# Patient Record
Sex: MALE | Race: WHITE | NOT HISPANIC OR LATINO | Employment: FULL TIME | URBAN - METROPOLITAN AREA
[De-identification: names, ages, dates, MRNs, and addresses within clinical notes are randomized per-mention and may not be internally consistent; named-entity substitution may affect disease eponyms.]

---

## 2017-06-20 ENCOUNTER — OFFICE VISIT (OUTPATIENT)
Dept: FAMILY MEDICINE | Facility: CLINIC | Age: 37
End: 2017-06-20
Payer: COMMERCIAL

## 2017-06-20 VITALS
DIASTOLIC BLOOD PRESSURE: 80 MMHG | HEIGHT: 74 IN | WEIGHT: 201.8 LBS | TEMPERATURE: 97.4 F | SYSTOLIC BLOOD PRESSURE: 127 MMHG | BODY MASS INDEX: 25.9 KG/M2 | OXYGEN SATURATION: 97 % | HEART RATE: 93 BPM

## 2017-06-20 DIAGNOSIS — F10.21 HISTORY OF ALCOHOLISM (H): ICD-10-CM

## 2017-06-20 DIAGNOSIS — G47.419 PRIMARY NARCOLEPSY WITHOUT CATAPLEXY: ICD-10-CM

## 2017-06-20 DIAGNOSIS — F17.210 SMOKES CIGARETTES: ICD-10-CM

## 2017-06-20 DIAGNOSIS — B00.1 RECURRENT COLD SORES: Primary | ICD-10-CM

## 2017-06-20 PROCEDURE — 99214 OFFICE O/P EST MOD 30 MIN: CPT | Performed by: FAMILY MEDICINE

## 2017-06-20 RX ORDER — VALACYCLOVIR HYDROCHLORIDE 1 G/1
2000 TABLET, FILM COATED ORAL 2 TIMES DAILY
Qty: 30 TABLET | Refills: 4 | Status: SHIPPED | OUTPATIENT
Start: 2017-06-20 | End: 2018-07-17

## 2017-06-20 NOTE — MR AVS SNAPSHOT
"              After Visit Summary   6/20/2017    Roddy Petit    MRN: [id number]           Patient Information     Date Of Birth          1980        Visit Information        Provider Department      6/20/2017 5:00 PM Martina Gillis MD [facility]        Today's Diagnoses     Recurrent cold sores    -  1    Smokes cigarettes        History of alcoholism (H)        Primary narcolepsy without cataplexy          Care Instructions      (B00.1) Recurrent cold sores  (primary encounter diagnosis)  Plan: valACYclovir (VALTREX) 1000 mg tablet 2 tabs twice daily for a single day  additional tabs provided to be used as needed  For same single day course            (F17.210) Smokes cigarettes  Comment: smokes 2 cigars a day- was chewing tobacco  encouarged smoke cessation- return office visit  to discuss medication options chantix vx zyban  Plan: as above  encouraged to cut back on the amount of cigar          Follow-ups after your visit        Who to contact     If you have questions or need follow up information about today's clinic visit or your schedule please contact [facility] directly at [phone].  Normal or non-critical lab and imaging results will be communicated to you by aXess americahart, letter or phone within 4 business days after the clinic has received the results. If you do not hear from us within 7 days, please contact the clinic through GiftLaunchert or phone. If you have a critical or abnormal lab result, we will notify you by phone as soon as possible.  Submit refill requests through Stroz Friedberg or call your pharmacy and they will forward the refill request to us. Please allow 3 business days for your refill to be completed.          Additional Information About Your Visit        MyChart Information     Stroz Friedberg lets you send messages to your doctor, view your test results, renew your prescriptions, schedule appointments and more. To sign up, go to [email or web address] . Click on \"Log " "in\" on the left side of the screen, which will take you to the Welcome page. Then click on \"Sign up Now\" on the right side of the page.     You will be asked to enter the access code listed below, as well as some personal information. Please follow the directions to create your username and password.     Your access code is: [email or web address]  Expires: 2017  5:39 PM     Your access code will  in 90 days. If you need help or a new code, please call your [facility] or [phone].        Care EveryWhere ID     This is your Care EveryWhere ID. This could be used by other organizations to access your [place] medical records  [id number]        Your Vitals Were     Pulse Temperature Height Pulse Oximetry BMI (Body Mass Index)       93 97.4  F (36.3  C) (Oral) 6' 1.5\" (1.867 m) 97% 26.26 kg/m2        Blood Pressure from Last 3 Encounters:   17 127/80   16 126/76   09/10/13 120/80    Weight from Last 3 Encounters:   17 201 lb 12.8 oz (91.5 kg)   16 202 lb 11.2 oz (91.9 kg)   09/10/13 207 lb (93.9 kg)              Today, you had the following     No orders found for display         Today's Medication Changes          These changes are accurate as of: 17  5:39 PM.  If you have any questions, ask your nurse or doctor.               Start taking these medicines.        Dose/Directions    valACYclovir 1000 mg tablet   Commonly known as:  VALTREX   Used for:  Recurrent cold sores   Started by:  Martina Gillis MD        Dose:  2000 mg   Take 2 tablets (2,000 mg) by mouth 2 times daily for 1 day   Quantity:  30 tablet   Refills:  4            Where to get your medicines      Some of these will need a paper prescription and others can be bought over the counter.  Ask your nurse if you have questions.     Bring a paper prescription for each of these medications     valACYclovir 1000 mg tablet                Primary Care Provider Office Phone # Fax #    Martina Gillis MD " [phone]       [facility] [address] [address]        Thank you!     Thank you for choosing [facility]  for your care. Our goal is always to provide you with excellent care. Hearing back from our patients is one way we can continue to improve our services. Please take a few minutes to complete the written survey that you may receive in the mail after your visit with us. Thank you!             Your Updated Medication List - Protect others around you: Learn how to safely use, store and throw away your medicines at www.disposemymeds.org.          This list is accurate as of: 6/20/17  5:39 PM.  Always use your most recent med list.                   Brand Name Dispense Instructions for use    ADDERALL 15 MG per tablet   Generic drug:  amphetamine-dextroamphetamine     60 tablet    Take 1 tablet (15 mg) by mouth 3 times daily       valACYclovir 1000 mg tablet    VALTREX    30 tablet    Take 2 tablets (2,000 mg) by mouth 2 times daily for 1 day

## 2017-06-20 NOTE — NURSING NOTE
"Chief Complaint   Patient presents with     Derm Problem     /80  Pulse 93  Temp 97.4  F (36.3  C) (Oral)  Ht 6' 1.5\" (1.867 m)  Wt 201 lb 12.8 oz (91.5 kg)  SpO2 97%  BMI 26.26 kg/m2 Estimated body mass index is 26.26 kg/(m^2) as calculated from the following:    Height as of this encounter: 6' 1.5\" (1.867 m).    Weight as of this encounter: 201 lb 12.8 oz (91.5 kg).  BP completed using cuff size: regular       Health Maintenance due pending provider review:  NONE    n/a      Calvin De Anda CMA  "

## 2017-06-20 NOTE — PROGRESS NOTES
"  SUBJECTIVE:                                                    Roddy Petit is a 37 year old male who presents to clinic today for the following health issues:      Cold sore       Duration: 3-4 weeks     Description  Location: lip  Itching: no, tingles off/on    Intensity:  moderate    Accompanying signs and symptoms: None    History (similar episodes/previous evaluation): yes 2 one in 2 months     Precipitating or alleviating factors:  New exposures:  None  Recent travel: no      Therapies tried and outcome: Patriciaeva   Helps but not going away. Sofie - helped some        PROBLEMS TO ADD ON...    Problem list and histories reviewed & adjusted, as indicated.  Additional history: as documented    Labs reviewed in EPIC    Reviewed and updated as needed this visit by clinical staff  Tobacco  Allergies  Meds  Med Hx  Surg Hx  Fam Hx  Soc Hx      Reviewed and updated as needed this visit by Provider         ROS:  Constitutional, HEENT, cardiovascular, pulmonary, gi and gu systems are negative, except as otherwise noted.    OBJECTIVE:                                                    /80  Pulse 93  Temp 97.4  F (36.3  C) (Oral)  Ht 6' 1.5\" (1.867 m)  Wt 201 lb 12.8 oz (91.5 kg)  SpO2 97%  BMI 26.26 kg/m2  Body mass index is 26.26 kg/(m^2).  GENERAL: healthy, alert and no distress  NECK: no adenopathy, no asymmetry, masses, or scars and thyroid normal to palpation  RESP: lungs clear to auscultation - no rales, rhonchi or wheezes  CV: regular rate and rhythm, normal S1 S2, no S3 or S4, no murmur, click or rub, no peripheral edema and peripheral pulses strong  MS: no gross musculoskeletal defects noted, no edema    Diagnostic Test Results:     ASSESSMENT/PLAN:                                                      (B00.1) Recurrent cold sores  (primary encounter diagnosis)  Plan: valACYclovir (VALTREX) 1000 mg tablet 2 tabs twice daily for a single day  additional tabs provided to be used as needed  For " same single day course            (F17.210) Smokes cigarettes  Comment: smokes 2 cigars a day- was chewing tobacco  encouarged smoke cessation- ROV to discuss medication options chantix vx zyban  Plan: as above    (F10.21) History of alcoholism (H)  Comment: Since 2009- sobriety  Plan: commendable and encouarging    (G47.419) Primary narcolepsy without cataplexy  Comment: stable on adderall under care of    Plan: no changed    Potential medication side effects were discussed with the patient; let me know if any occur.      Potential medication side effects were discussed with the patient; let me know if any occur.  The patient indicates understanding of these issues and agrees with the plan.            Martina Gillis MD  [facility]

## 2017-06-20 NOTE — PATIENT INSTRUCTIONS
(B00.1) Recurrent cold sores  (primary encounter diagnosis)  Plan: valACYclovir (VALTREX) 1000 mg tablet 2 tabs twice daily for a single day  additional tabs provided to be used as needed  For same single day course            (F17.210) Smokes cigarettes  Comment: smokes 2 cigars a day- was chewing tobacco  encouarged smoke cessation- return office visit  to discuss medication options chantix vx zyban  Plan: as above  encouraged to cut back on the amount of cigar

## 2017-12-19 ENCOUNTER — OFFICE VISIT (OUTPATIENT)
Dept: FAMILY MEDICINE | Facility: CLINIC | Age: 37
End: 2017-12-19
Payer: COMMERCIAL

## 2017-12-19 VITALS
OXYGEN SATURATION: 100 % | HEIGHT: 74 IN | WEIGHT: 199.2 LBS | SYSTOLIC BLOOD PRESSURE: 119 MMHG | DIASTOLIC BLOOD PRESSURE: 74 MMHG | BODY MASS INDEX: 25.57 KG/M2 | HEART RATE: 89 BPM | TEMPERATURE: 97.4 F

## 2017-12-19 DIAGNOSIS — M25.561 ACUTE PAIN OF RIGHT KNEE: Primary | ICD-10-CM

## 2017-12-19 DIAGNOSIS — D22.9 ATYPICAL MOLE: ICD-10-CM

## 2017-12-19 DIAGNOSIS — M21.41 FLAT FEET, BILATERAL: ICD-10-CM

## 2017-12-19 DIAGNOSIS — M21.42 FLAT FEET, BILATERAL: ICD-10-CM

## 2017-12-19 PROCEDURE — 99214 OFFICE O/P EST MOD 30 MIN: CPT | Performed by: FAMILY MEDICINE

## 2017-12-19 NOTE — PATIENT INSTRUCTIONS
Right anterior knee pain  symptomatic treatment- with ice or warm packs if in pain. Ibuprofen  600 mg with joy up to three times daily as needed - always with meals    Knee brace over the counter      Start PHYSICAL THERAPY if pain persist  [facility]Y [place] [facility], [phone]       Flat feet  Orthotics    Right toe- Mole/ atypica; derm referral

## 2017-12-19 NOTE — NURSING NOTE
"Chief Complaint   Patient presents with     Musculoskeletal Problem       Initial /74  Pulse 89  Temp 97.4  F (36.3  C) (Oral)  Ht 6' 1.5\" (1.867 m)  Wt 199 lb 3.2 oz (90.4 kg)  SpO2 100%  BMI 25.92 kg/m2 Estimated body mass index is 25.92 kg/(m^2) as calculated from the following:    Height as of this encounter: 6' 1.5\" (1.867 m).    Weight as of this encounter: 199 lb 3.2 oz (90.4 kg).  Medication Reconciliation: complete      Health Maintenance that is potentially due pending provider review:  NONE    n/a    NINA Newman  "

## 2017-12-19 NOTE — PROGRESS NOTES
"  SUBJECTIVE:   Roddy Petit is a 37 year old male who presents to clinic today for the following health issues:    He reports increase excercise /running on treadmill  Noted pain on the anterior of the knee and inside of right lower tigh, felt it was associated with the knee looking read and felt warm- pain and redness improved on stopping excercise     No acute swelling, or involvement of any further joints    Musculoskeletal problem/pain      Duration: x1 week    Description  Location: RT knee     Intensity:  Mild to moderate     Accompanying signs and symptoms: tingling and warmth    History  Previous similar problem: no   Previous evaluation:  none    Precipitating or alleviating factors:  Trauma or overuse: no   Aggravating factors include: exercise    Therapies tried and outcome: ice, support wrap, advil, hot bath - helps with symptoms         PROBLEMS TO ADD ON...    Problem list and histories reviewed & adjusted, as indicated.  Additional history: as documented    Labs reviewed in EPIC    Reviewed and updated as needed this visit by clinical staff     Reviewed and updated as needed this visit by Provider         ROS:  Constitutional, HEENT, cardiovascular, pulmonary, gi and gu systems are negative, except as otherwise noted.      OBJECTIVE:   /74  Pulse 89  Temp 97.4  F (36.3  C) (Oral)  Ht 6' 1.5\" (1.867 m)  Wt 199 lb 3.2 oz (90.4 kg)  SpO2 100%  BMI 25.92 kg/m2  Body mass index is 25.92 kg/(m^2).  GENERAL: healthy, alert and no distress  Knee exam: no swelling/effusion.  Good ROM. No joint line tenderness.   Flat feet.  Right big toe has dark mole about 2mms, with brown irregular halo around it- he reports he does not recall recent, remote direct trauma  NECK: no adenopathy, no asymmetry, masses, or scars and thyroid normal to palpation  RESP: lungs clear to auscultation - no rales, rhonchi or wheezes  CV: regular rate and rhythm  ASSESSMENT/PLAN:     Right anterior knee pain- tendonitis. "   Plan: advised relative rest.  symptomatic treatment- with ice or warm packs if in pain. Ibuprofen  600 mg with meals  up to three times daily as needed   Knee brace over the counter  Start PHYSICAL THERAPY if pain persist      Flat feet  Possibly adding to pain in the knee  Advised Orthotics    Right toe- Mole/ atypica; derm referral           Martina Gillis MD  [facility]

## 2017-12-19 NOTE — MR AVS SNAPSHOT
After Visit Summary   12/19/2017    Roddy Petit    MRN: [id number]           Patient Information     Date Of Birth          1980        Visit Information        Provider Department      12/19/2017 3:30 PM Martina Gillis MD [facility]        Today's Diagnoses     Acute pain of right knee    -  1    Flat feet, bilateral        Atypical mole          Care Instructions    Right anterior knee pain  symptomatic treatment- with ice or warm packs if in pain. Ibuprofen  600 mg with joy up to three times daily as needed - always with meals    Knee brace over the counter      Start PHYSICAL THERAPY if pain persist  PHYSICAL THERAPY [place] [facility], [phone]       Flat feet  Orthotics    Right toe- Mole/ atypica; derm referral           Follow-ups after your visit        Additional Services     DERMATOLOGY REFERRAL       Your provider has referred you to: [facility]: Dermatology Clinic [facility] [phone]   [email or web address]  [facility]: [facility]at[facility] [phone]   [email or web address]  [facility]: [place] [facility] [phone]  [email or web address]    Please be aware that coverage of these services is subject to the terms and limitations of your health insurance plan.  Call member services at your health plan with any benefit or coverage questions.      Please bring the following with you to your appointment:    (1) Any X-Rays, CTs or MRIs which have been performed.  Contact the facility where they were done to arrange for  prior to your scheduled appointment.    (2) List of current medications  (3) This referral request   (4) Any documents/labs given to you for this referral            [facility] PT, HAND, AND CHIROPRACTIC REFERRAL       **This order will print in the [facility] Scheduling Office**    Physical Therapy, Hand Therapy and Chiropractic Care are available through:    *[place] [facility]  *[facility]  *[facility]    Call one number to schedule at any of the above locations: [phone].    Your provider has referred you to: Physical Therapy at [facility] or [facility]    Indication/Reason for Referral: anterior knee pain, mild tendonistis, plays volley ball as well and flat feet  Onset of Illness: weeks   Therapy Orders: Evaluate and Treat  Special Programs: None  Special Request: None    Umair Block      Additional Comments for the Therapist or Chiropractor:     Please be aware that coverage of these services is subject to the terms and limitations of your health insurance plan.  Call member services at your health plan with any benefit or coverage questions.      Please bring the following to your appointment:    *Your personal calendar for scheduling future appointments  *Comfortable clothing                  Who to contact     If you have questions or need follow up information about today's clinic visit or your schedule please contact [facility] directly at [phone].  Normal or non-critical lab and imaging results will be communicated to you by MyChart, letter or phone within 4 business days after the clinic has received the results. If you do not hear from us within 7 days, please contact the clinic through Paymohart or phone. If you have a critical or abnormal lab result, we will notify you by phone as soon as possible.  Submit refill requests through Responsa or call your pharmacy and they will forward the refill request to us. Please allow 3 business days for your refill to be completed.          Additional Information About Your Visit        Paymohart Information     Responsa lets you send messages to your doctor, view your test results, renew your prescriptions, schedule appointments and more. To sign up, go to [email or web address] . Click on \"Log in\" on the left side of the screen, which will take you to the Welcome page. Then " "click on \"Sign up Now\" on the right side of the page.     You will be asked to enter the access code listed below, as well as some personal information. Please follow the directions to create your username and password.     Your access code is: [email or web address]  Expires: 3/19/2018  4:02 PM     Your access code will  in 90 days. If you need help or a new code, please call your [facility]] or [phone].        Care EveryWhere ID     This is your Care EveryWhere ID. This could be used by other organizations to access your [place] medical records  [id number]        Your Vitals Were     Pulse Temperature Height Pulse Oximetry BMI (Body Mass Index)       89 97.4  F (36.3  C) (Oral) 6' 1.5\" (1.867 m) 100% 25.92 kg/m2        Blood Pressure from Last 3 Encounters:   17 119/74   17 127/80   16 126/76    Weight from Last 3 Encounters:   17 199 lb 3.2 oz (90.4 kg)   17 201 lb 12.8 oz (91.5 kg)   16 202 lb 11.2 oz (91.9 kg)              We Performed the Following     DERMATOLOGY REFERRAL     [facility] PT, HAND, AND CHIR[facility]RACTIC REFERRAL          Today's Medication Changes          These changes are accurate as of: 17  4:02 PM.  If you have any questions, ask your nurse or doctor.               Start taking these medicines.        Dose/Directions    order for DME   Used for:  Flat feet, bilateral   Started by:  Martina Gillis MD        Equipment being ordered: orthotics   Quantity:  2 Units   Refills:  1            Where to get your medicines      Some of these will need a paper prescription and others can be bought over the counter.  Ask your nurse if you have questions.     Bring a paper prescription for each of these medications     order for DME                Primary Care Provider Office Phone # Fax #    Martina Gillis [phone] [address] [facility] [address]        Equal Access to Services     RENEE BOTELLO AH: Percy jordan " Nini, venusda lumikeadaha, qajoshuata kaethan sommers, palmer ibarra tessademetrio weberzack noel. So [facility] [phone].    ATENCIÓN: Si wendy jiménez, tiene a aceves disposición servicios gratuitos de asistencia lingüística. Kyle al [phone].    We comply with applicable federal civil rights laws and [place] laws. We do not discriminate on the basis of race, color, national origin, age, disability, sex, sexual orientation, or gender identity.            Thank you!     Thank you for choosing [facility]  for your care. Our goal is always to provide you with excellent care. Hearing back from our patients is one way we can continue to improve our services. Please take a few minutes to complete the written survey that you may receive in the mail after your visit with us. Thank you!             Your Updated Medication List - Protect others around you: Learn how to safely use, store and throw away your medicines at www.disposemymeds.org.          This list is accurate as of: 12/19/17  4:02 PM.  Always use your most recent med list.                   Brand Name Dispense Instructions for use Diagnosis    ADDERALL 15 MG per tablet   Generic drug:  amphetamine-dextroamphetamine     60 tablet    Take 1 tablet (15 mg) by mouth 3 times daily        order for DME     2 Units    Equipment being ordered: orthotics    Flat feet, bilateral       valACYclovir 1000 mg tablet    VALTREX    30 tablet    Take 2 tablets (2,000 mg) by mouth 2 times daily for 1 day    Recurrent cold sores

## 2018-02-20 ENCOUNTER — OFFICE VISIT (OUTPATIENT)
Dept: FAMILY MEDICINE | Facility: CLINIC | Age: 38
End: 2018-02-20
Payer: COMMERCIAL

## 2018-02-20 VITALS
SYSTOLIC BLOOD PRESSURE: 122 MMHG | HEIGHT: 74 IN | DIASTOLIC BLOOD PRESSURE: 78 MMHG | WEIGHT: 206.5 LBS | TEMPERATURE: 98.5 F | OXYGEN SATURATION: 100 % | HEART RATE: 88 BPM | BODY MASS INDEX: 26.5 KG/M2 | RESPIRATION RATE: 15 BRPM

## 2018-02-20 DIAGNOSIS — B30.9 ACUTE VIRAL CONJUNCTIVITIS OF LEFT EYE: Primary | ICD-10-CM

## 2018-02-20 PROCEDURE — 99213 OFFICE O/P EST LOW 20 MIN: CPT | Performed by: FAMILY MEDICINE

## 2018-02-20 RX ORDER — CIPROFLOXACIN HYDROCHLORIDE 3.5 MG/ML
SOLUTION/ DROPS TOPICAL
Qty: 1 BOTTLE | Refills: 0 | Status: SHIPPED | OUTPATIENT
Start: 2018-02-20 | End: 2018-02-24

## 2018-02-20 NOTE — MR AVS SNAPSHOT
After Visit Summary   2/20/2018    Roddy Petit    MRN: [id number]           Patient Information     Date Of Birth          1980        Visit Information        Provider Department      2/20/2018 4:45 PM Dewayne Sheppard MD [facility]        Today's Diagnoses     Acute bacterial conjunctivitis of left eye    -  1      Care Instructions      What Is Conjunctivitis?    Conjunctivitis is an irritation or infection. It affects the membrane that covers the white of your eye and the inside of your eyelid (conjunctiva). It can happen to one or both eyes. The membrane swells and the blood vessels enlarge (dilate). This makes your eye red. That's why conjunctivitis is sometimes called red eye or pink eye.  What are the symptoms?  If you have one or more of these symptoms, see an eye doctor:    Redness in and around your eye    Eyes that are puffy and sore    Itching, burning, or stinging eyes    Watery eyes or discharge from your eye    Eyelids that are crusty or stuck together when you wake up in the morning    Pink color in the whites of one or both eyes  Getting treatment quickly can help prevent damage to your eyes.  How is it diagnosed?  Conjunctivitis is usually a minor eye infection. But it can sometimes become a more serious problem. Some more serious eye diseases have symptoms that look like conjunctivitis. So it's important for an eye doctor to diagnose you. Your eye doctor will ask about your symptoms and any medicines you take. He or she will ask about any illnesses or medical conditions you may have. The doctor will also check your eyes with a hand-held light and a special microscope called a slit lamp.  Date Last Reviewed: 6/11/2015 2000-2017 [employer]. [address] [place] [address]. All rights reserved. This information is not intended as a substitute for professional medical care. Always follow your healthcare professional's  "instructions.          Follow-ups after your visit        Follow-up notes from your care team     Return if symptoms worsen or fail to improve.      Who to contact     If you have questions or need follow up information about today's clinic visit or your schedule please contact [facility] directly at [phone].  Normal or non-critical lab and imaging results will be communicated to you by MyChart, letter or phone within 4 business days after the clinic has received the results. If you do not hear from us within 7 days, please contact the clinic through MyChart or phone. If you have a critical or abnormal lab result, we will notify you by phone as soon as possible.  Submit refill requests through [employer] or call your pharmacy and they will forward the refill request to us. Please allow 3 business days for your refill to be completed.          Additional Information About Your Visit        MyChart Information     [employer] lets you send messages to your doctor, view your test results, renew your prescriptions, schedule appointments and more. To sign up, go to [email or web address] . Click on \"Log in\" on the left side of the screen, which will take you to the Welcome page. Then click on \"Sign up Now\" on the right side of the page.     You will be asked to enter the access code listed below, as well as some personal information. Please follow the directions to create your username and password.     Your access code is: [email or web address]  Expires: 3/19/2018  4:02 PM     Your access code will  in 90 days. If you need help or a new code, please call your [place] clinic or [phone].        Care EveryWhere ID     This is your Care EveryWhere ID. This could be used by other organizations to access your [place] medical records  [id number]        Your Vitals Were     Pulse Temperature Respirations Height Pulse Oximetry BMI (Body Mass Index)    88 98.5  F (36.9  C) (Tympanic) 15 6' 1.5\" (1.867 m) 100% " 26.87 kg/m2       Blood Pressure from Last 3 Encounters:   02/20/18 122/78   12/19/17 119/74   06/20/17 127/80    Weight from Last 3 Encounters:   02/20/18 206 lb 8 oz (93.7 kg)   12/19/17 199 lb 3.2 oz (90.4 kg)   06/20/17 201 lb 12.8 oz (91.5 kg)              Today, you had the following     No orders found for display         Today's Medication Changes          These changes are accurate as of 2/20/18  4:55 PM.  If you have any questions, ask your nurse or doctor.               Start taking these medicines.        Dose/Directions    ciprofloxacin 0.3 % ophthalmic solution   Commonly known as:  CILOXAN   Used for:  Acute bacterial conjunctivitis of left eye   Started by:  Dewayne Sheppard MD        Instill 1-2 drops in the affected eye(s) every 2 hours while awake for 2 days then 1-2 drops every 4 hours while awake for the next 5 days.   Quantity:  1 Bottle   Refills:  0            Where to get your medicines      These medications were sent to [facility] [address] [place] [address] [address] [facility] [address] [facility] [address]     Phone:  [phone]     ciprofloxacin 0.3 % ophthalmic solution                Primary Care Provider Office Phone # Fax #    Martina Elana Gillis [phone] [address] [facility] [address]        Equal Access to Services     RENEE BOTELLO AH: Hadii tacos camposo Solaura, waaxda luqadaha, qaybta kaalmafrancisco velezay alisha cohen. So [facility] [phone].    ATENCIÓN: Si habla español, tiene a aceves disposición servicios gratuitos de asistencia lingüística. Llame al [phone].    We comply with applicable federal civil rights laws and [place] laws. We do not discriminate on the basis of race, color, national origin, age, disability, sex, sexual orientation, or gender identity.            Thank you!     Thank you for choosing [facility]  for your care. Our goal  is always to provide you with excellent care. Hearing back from our patients is one way we can continue to improve our services. Please take a few minutes to complete the written survey that you may receive in the mail after your visit with us. Thank you!             Your Updated Medication List - Protect others around you: Learn how to safely use, store and throw away your medicines at www.disposemymeds.org.          This list is accurate as of 2/20/18  4:55 PM.  Always use your most recent med list.                   Brand Name Dispense Instructions for use Diagnosis    ADDERALL 15 MG per tablet   Generic drug:  amphetamine-dextroamphetamine     60 tablet    Take 1 tablet (15 mg) by mouth 3 times daily        ciprofloxacin 0.3 % ophthalmic solution    CILOXAN    1 Bottle    Instill 1-2 drops in the affected eye(s) every 2 hours while awake for 2 days then 1-2 drops every 4 hours while awake for the next 5 days.    Acute bacterial conjunctivitis of left eye       order for DME     2 Units    Equipment being ordered: orthotics    Flat feet, bilateral       valACYclovir 1000 mg tablet    VALTREX    30 tablet    Take 2 tablets (2,000 mg) by mouth 2 times daily for 1 day    Recurrent cold sores

## 2018-02-20 NOTE — PROGRESS NOTES
"  SUBJECTIVE:   Roddy Petit is a 38 year old male who presents to clinic today for the following health issues:      Chief Complaint   Patient presents with     Eye Problem     Left eye is extremely red, irritated and has lots of drainage since yesterday.  Woke up this morning and his eye was matted closed.     Left eye redness x 1 day. Denies any vision changes. Denies any headaches. Denies any fevers or chills.     Problem list and histories reviewed & adjusted, as indicated.  Additional history: as documented    Patient Active Problem List   Diagnosis     Narcolepsy     CARDIOVASCULAR SCREENING; LDL GOAL LESS THAN 160     History of alcoholism (H)     Primary narcolepsy without cataplexy     History reviewed. No pertinent surgical history.    Social History   Substance Use Topics     Smoking status: Current Some Day Smoker     Types: Cigars     Smokeless tobacco: Never Used     Alcohol use No     Family History   Problem Relation Age of Onset     Family History Negative Father      Family History Negative Mother            Reviewed and updated as needed this visit by clinical staff  Allergies  Meds       Reviewed and updated as needed this visit by Provider         ROS:  Constitutional, HEENT, cardiovascular, pulmonary, gi and gu systems are negative, except as otherwise noted.    OBJECTIVE:     /78  Pulse 88  Temp 98.5  F (36.9  C) (Tympanic)  Resp 15  Ht 6' 1.5\" (1.867 m)  Wt 206 lb 8 oz (93.7 kg)  SpO2 100%  BMI 26.87 kg/m2  Body mass index is 26.87 kg/(m^2).  GENERAL: healthy, alert and no distress  EYES: EOMI. left conjunctival erythema with crusting on the eyelashes.  PERRL  PSYCH: mentation appears normal, affect normal/bright    Diagnostic Test Results:  none     ASSESSMENT/PLAN:   1. Acute viral conjunctivitis of left eye  Assessment: Exam is consistent with viral conjunctivitis.  Patient was given a prescription of antibiotics to prevent secondary bacterial infection.  Plan:  - " ciprofloxacin (CILOXAN) 0.3 % ophthalmic solution; Instill 1-2 drops in the affected eye(s) every 2 hours while awake for 2 days then 1-2 drops every 4 hours while awake for the next 5 days.  Dispense: 1 Bottle; Refill: 0      Dewayne Sheppard MD  [facility] [place]  PAGER: [phone] or (W): [phone]

## 2018-02-20 NOTE — PATIENT INSTRUCTIONS
What Is Conjunctivitis?    Conjunctivitis is an irritation or infection. It affects the membrane that covers the white of your eye and the inside of your eyelid (conjunctiva). It can happen to one or both eyes. The membrane swells and the blood vessels enlarge (dilate). This makes your eye red. That's why conjunctivitis is sometimes called red eye or pink eye.  What are the symptoms?  If you have one or more of these symptoms, see an eye doctor:    Redness in and around your eye    Eyes that are puffy and sore    Itching, burning, or stinging eyes    Watery eyes or discharge from your eye    Eyelids that are crusty or stuck together when you wake up in the morning    Pink color in the whites of one or both eyes  Getting treatment quickly can help prevent damage to your eyes.  How is it diagnosed?  Conjunctivitis is usually a minor eye infection. But it can sometimes become a more serious problem. Some more serious eye diseases have symptoms that look like conjunctivitis. So it's important for an eye doctor to diagnose you. Your eye doctor will ask about your symptoms and any medicines you take. He or she will ask about any illnesses or medical conditions you may have. The doctor will also check your eyes with a hand-held light and a special microscope called a slit lamp.  Date Last Reviewed: 6/11/2015 2000-2017 [employer]. [address] [place] [address], [place] [address]. All rights reserved. This information is not intended as a substitute for professional medical care. Always follow your healthcare professional's instructions.

## 2018-02-24 ENCOUNTER — NURSE TRIAGE (OUTPATIENT)
Dept: NURSING | Facility: CLINIC | Age: 38
End: 2018-02-24

## 2018-02-24 DIAGNOSIS — B30.9 ACUTE VIRAL CONJUNCTIVITIS OF LEFT EYE: ICD-10-CM

## 2018-02-24 RX ORDER — CIPROFLOXACIN HYDROCHLORIDE 3.5 MG/ML
SOLUTION/ DROPS TOPICAL
Qty: 1 BOTTLE | Refills: 0 | Status: SHIPPED | OUTPATIENT
Start: 2018-02-24 | End: 2019-10-24

## 2018-02-24 NOTE — TELEPHONE ENCOUNTER
----- Message from Krishan Escudero sent at 2/24/2018 12:09 PM CST -----  Reason for Call:  Medication or medication refill:    Do you use a [facility]?  Name of the pharmacy and phone number for the current request: [address] [place], [place]    Name of the medication requested: ciprofloxacin (CILOXAN) 0.3 % ophthalmic solution    Other request: Patient is out of this medication and states he needs to take it until Tuesday. Wondering if you can call in a renewal for him. Please advise.    Can we leave a detailed message on this number? YES    Phone number patient can be reached at: Home number on file [phone] (home)    Best Time: Anytime    Call taken on 2/24/2018 at 12:08 PM by Krishan Escudeor

## 2018-02-24 NOTE — TELEPHONE ENCOUNTER
"\"I was given eye drops for my left eye on 2/20/18 but then woke up the next day with my right eye affected.  I have been using the drops in both eyes so I ran out.\"      Smart Web used to page Dr. ALVARADO at 12:24pm to [phone].  She returned the call shortly after and gave the approval to send the refill.      Palmira Evans RN/FNA    "

## 2018-07-17 ENCOUNTER — OFFICE VISIT (OUTPATIENT)
Dept: FAMILY MEDICINE | Facility: CLINIC | Age: 38
End: 2018-07-17
Payer: COMMERCIAL

## 2018-07-17 VITALS
HEART RATE: 73 BPM | DIASTOLIC BLOOD PRESSURE: 82 MMHG | HEIGHT: 74 IN | WEIGHT: 201.9 LBS | TEMPERATURE: 97.9 F | BODY MASS INDEX: 25.91 KG/M2 | OXYGEN SATURATION: 99 % | SYSTOLIC BLOOD PRESSURE: 126 MMHG

## 2018-07-17 DIAGNOSIS — G47.419 PRIMARY NARCOLEPSY WITHOUT CATAPLEXY: ICD-10-CM

## 2018-07-17 DIAGNOSIS — L73.9 FOLLICULITIS: Primary | ICD-10-CM

## 2018-07-17 DIAGNOSIS — B00.1 RECURRENT COLD SORES: ICD-10-CM

## 2018-07-17 PROCEDURE — 36415 COLL VENOUS BLD VENIPUNCTURE: CPT | Performed by: FAMILY MEDICINE

## 2018-07-17 PROCEDURE — 99214 OFFICE O/P EST MOD 30 MIN: CPT | Performed by: FAMILY MEDICINE

## 2018-07-17 PROCEDURE — 80053 COMPREHEN METABOLIC PANEL: CPT | Performed by: FAMILY MEDICINE

## 2018-07-17 RX ORDER — CEPHALEXIN 500 MG/1
500 CAPSULE ORAL 2 TIMES DAILY
Qty: 10 CAPSULE | Refills: 0 | Status: SHIPPED | OUTPATIENT
Start: 2018-07-17 | End: 2018-07-31

## 2018-07-17 RX ORDER — VALACYCLOVIR HYDROCHLORIDE 1 G/1
2000 TABLET, FILM COATED ORAL 2 TIMES DAILY
Qty: 30 TABLET | Refills: 4 | Status: SHIPPED | OUTPATIENT
Start: 2018-07-17 | End: 2019-02-13

## 2018-07-17 NOTE — PROGRESS NOTES
"  SUBJECTIVE:   Roddy Petit is a 38 year old male who presents to clinic today for the following health issues:      Mass/Lump      Duration: noticed pea size nontender hard nodule, below the scrotum about a week ago     Description (location/character/radiation): below the scrotum    Intensity:  Firm to touch     Accompanying signs and symptoms: small hard lump about the size of a pea below testicle     History (similar episodes/previous evaluation): None    Precipitating or alleviating factors: None    Therapies tried and outcome: None       PROBLEMS TO ADD ON...recurrent cold sores- more so lately- as been under considerable stress at work - would like refill    History of narcolepsy- stable on adderall under care of        Problem list and histories reviewed & adjusted, as indicated.  Additional history: as documented    Patient Active Problem List   Diagnosis     Narcolepsy     CARDIOVASCULAR SCREENING; LDL GOAL LESS THAN 160     History of alcoholism (H)     Primary narcolepsy without cataplexy     No past surgical history on file.    Social History   Substance Use Topics     Smoking status: Current Some Day Smoker     Types: Cigars     Smokeless tobacco: Never Used     Alcohol use No     Family History   Problem Relation Age of Onset     Family History Negative Father      Family History Negative Mother            Reviewed and updated as needed this visit by clinical staff  Tobacco       Reviewed and updated as needed this visit by Provider         ROS:  Constitutional, HEENT, cardiovascular, pulmonary, gi and gu systems are negative, except as otherwise noted.    OBJECTIVE:     /82  Pulse 73  Temp 97.9  F (36.6  C) (Oral)  Ht 6' 1.5\" (1.867 m)  Wt 201 lb 14.4 oz (91.6 kg)  SpO2 99%  BMI 26.28 kg/m2  Body mass index is 26.28 kg/(m^2).  GENERAL: healthy, alert and no distress  NECK: no adenopathy, no asymmetry, masses, or scars and thyroid normal to palpation  RESP: lungs clear to " auscultation - no rales, rhonchi or wheezes  CV: regular rate and rhythm,ABDOMEN: soft, nontender,   Genitals normal; both testes normal without tenderness, masses, hydroceles, varicoceles, erythema or swelling. Shaft normal, circumcised, meatus normal without discharge. No inguinal hernia noted. No inguinal lymphadenopathy.  SKIN: inflammed folliculitis over left anterior perineum in hair covered area. No vesicle, no ulcers    ASSESSMENT/PLAN:     1. Folliculitis  Ingrown hair- left perineum  Warm pack ok    - cephALEXin (KEFLEX) 500 MG capsule; Take 1 capsule (500 mg) by mouth 2 times daily for 5 days  Dispense: 10 capsule; Refill: 0    -follow up as needed    2. Recurrent cold sores    - valACYclovir (VALTREX) 1000 mg tablet; Take 2 tablets (2,000 mg) by mouth 2 times daily  Dispense: 30 tablet; Refill: 4  - Comprehensive metabolic panel (BMP + Alb, Alk Phos, ALT, AST, Total. Bili, TP)    3. Primary narcolepsy without cataplexy  stable on adderall under care of  Dr.Schweiter Martina Gillis MD  [facility]

## 2018-07-17 NOTE — LETTER
July 19, 2018      Roddy Petit  [address]   [place] [facility] [place] [address]        Dear ,    We are writing to inform you of your test results.    -Liver and gallbladder tests are normal. (ALT,AST, Alk phos, bilirubin), kidney function is normal (Cr, GFR), Sodium is normal, Potassium is normal, Calcium is normal, Glucose is high but fasting glucose reflects more accurately diabetes screening test     Resulted Orders   Comprehensive metabolic panel (BMP + Alb, Alk Phos, ALT, AST, Total. Bili, TP)   Result Value Ref Range    Sodium 139 133 - 144 mmol/L    Potassium 4.0 3.4 - 5.3 mmol/L    Chloride 105 94 - 109 mmol/L    Carbon Dioxide 26 20 - 32 mmol/L    Anion Gap 8 3 - 14 mmol/L    Glucose 90 70 - 99 mg/dL    Urea Nitrogen 19 7 - 30 mg/dL    Creatinine 0.91 0.66 - 1.25 mg/dL    GFR Estimate >90 >60 mL/min/1.7m2      Comment:      Non  GFR Calc    GFR Estimate If Black >90 >60 mL/min/1.7m2      Comment:       GFR Calc    Calcium 8.6 8.5 - 10.1 mg/dL    Bilirubin Total 0.3 0.2 - 1.3 mg/dL    Albumin 3.9 3.4 - 5.0 g/dL    Protein Total 7.1 6.8 - 8.8 g/dL    Alkaline Phosphatase 39 (L) 40 - 150 U/L    ALT 29 0 - 70 U/L    AST 18 0 - 45 U/L       If you have any questions or concerns, please call the clinic at the number listed above.       Sincerely,        Martina Gillis MD/ms

## 2018-07-17 NOTE — PATIENT INSTRUCTIONS
1. Folliculitis  Ingrown hair- left perineum  Warm pack ok  I  - cephALEXin (KEFLEX) 500 MG capsule; Take 1 capsule (500 mg) by mouth 2 times daily for 5 days  Dispense: 10 capsule; Refill: 0    2. Recurrent cold sores    - valACYclovir (VALTREX) 1000 mg tablet; Take 2 tablets (2,000 mg) by mouth 2 times daily  Dispense: 30 tablet; Refill: 4  - Comprehensive metabolic panel (BMP + Alb, Alk Phos, ALT, AST, Total. Bili, TP)

## 2018-07-17 NOTE — MR AVS SNAPSHOT
"              After Visit Summary   7/17/2018    Roddy Petit    MRN: [id number]           Patient Information     Date Of Birth          1980        Visit Information        Provider Department      7/17/2018 4:20 PM Martina Gillis MD [facility]        Today's Diagnoses     Primary narcolepsy without cataplexy    -  1    Folliculitis        Recurrent cold sores          Care Instructions    1. Folliculitis  Ingrown hair- left perineum  Warm pack ok  I  - cephALEXin (KEFLEX) 500 MG capsule; Take 1 capsule (500 mg) by mouth 2 times daily for 5 days  Dispense: 10 capsule; Refill: 0    2. Recurrent cold sores    - valACYclovir (VALTREX) 1000 mg tablet; Take 2 tablets (2,000 mg) by mouth 2 times daily  Dispense: 30 tablet; Refill: 4  - Comprehensive metabolic panel (BMP + Alb, Alk Phos, ALT, AST, Total. Bili, TP)          Follow-ups after your visit        Who to contact     If you have questions or need follow up information about today's clinic visit or your schedule please contact [facility] directly at [phone].  Normal or non-critical lab and imaging results will be communicated to you by MyChart, letter or phone within 4 business days after the clinic has received the results. If you do not hear from us within 7 days, please contact the clinic through MyChart or phone. If you have a critical or abnormal lab result, we will notify you by phone as soon as possible.  Submit refill requests through [employer] or call your pharmacy and they will forward the refill request to us. Please allow 3 business days for your refill to be completed.          Additional Information About Your Visit        Care EveryWhere ID     This is your Care EveryWhere ID. This could be used by other organizations to access your [place] medical records  [id number]        Your Vitals Were     Pulse Temperature Height Pulse Oximetry BMI (Body Mass Index)       73 97.9  F (36.6  C) (Oral) 6' 1.5\" (1.867 m) " 99% 26.28 kg/m2        Blood Pressure from Last 3 Encounters:   07/17/18 126/82   02/20/18 122/78   12/19/17 119/74    Weight from Last 3 Encounters:   07/17/18 201 lb 14.4 oz (91.6 kg)   02/20/18 206 lb 8 oz (93.7 kg)   12/19/17 199 lb 3.2 oz (90.4 kg)              We Performed the Following     Comprehensive metabolic panel (BMP + Alb, Alk Phos, ALT, AST, Total. Bili, TP)          Today's Medication Changes          These changes are accurate as of 7/17/18  4:37 PM.  If you have any questions, ask your nurse or doctor.               Start taking these medicines.        Dose/Directions    cephALEXin 500 MG capsule   Commonly known as:  KEFLEX   Used for:  Folliculitis   Started by:  Martina Gillis MD        Dose:  500 mg   Take 1 capsule (500 mg) by mouth 2 times daily for 5 days   Quantity:  10 capsule   Refills:  0            Where to get your medicines      [facility] [address] [place] [address] [employer] [address] [place] [address] [facility] [address]     Phone:  [phone]     cephALEXin 500 MG capsule    valACYclovir 1000 mg tablet                Primary Care Provider Office Phone # Fax #    Martina Gillis [phone] [address] [facility] [address]        Equal Access to Services     [facility] AH: Percy camposo Solaura, waaxda luqadaha, qaybta kaalmada tootie, palmer cohen. So [facility] [phone].    ATENCIÓN: Si habla tino, tiene a aceves disposición servicios gratuitos de asistencia lingüística. Llame al [phone].    We comply with applicable federal civil rights laws and [place] laws. We do not discriminate on the basis of race, color, national origin, age, disability, sex, sexual orientation, or gender identity.            Thank you!     Thank you for choosing [facility] [place] [facility]  for your care. Our goal is always to provide you with  excellent care. Hearing back from our patients is one way we can continue to improve our services. Please take a few minutes to complete the written survey that you may receive in the mail after your visit with us. Thank you!             Your Updated Medication List - Protect others around you: Learn how to safely use, store and throw away your medicines at www.disposemymeds.org.          This list is accurate as of 7/17/18  4:37 PM.  Always use your most recent med list.                   Brand Name Dispense Instructions for use Diagnosis    ADDERALL 15 MG per tablet   Generic drug:  amphetamine-dextroamphetamine     60 tablet    Take 1 tablet (15 mg) by mouth 3 times daily        cephALEXin 500 MG capsule    KEFLEX    10 capsule    Take 1 capsule (500 mg) by mouth 2 times daily for 5 days    Folliculitis       ciprofloxacin 0.3 % ophthalmic solution    CILOXAN    1 Bottle    Instill 1-2 drops in the affected eye(s) every 2 hours while awake for 2 days then 1-2 drops every 4 hours while awake for the next 5 days.    Acute viral conjunctivitis of left eye       order for DME     2 Units    Equipment being ordered: orthotics    Flat feet, bilateral       valACYclovir 1000 mg tablet    VALTREX    30 tablet    Take 2 tablets (2,000 mg) by mouth 2 times daily    Recurrent cold sores

## 2018-07-17 NOTE — NURSING NOTE
"Chief Complaint   Patient presents with     Mass     /82  Pulse 73  Temp 97.9  F (36.6  C) (Oral)  Ht 6' 1.5\" (1.867 m)  Wt 201 lb 14.4 oz (91.6 kg)  SpO2 99%  BMI 26.28 kg/m2 Estimated body mass index is 26.28 kg/(m^2) as calculated from the following:    Height as of this encounter: 6' 1.5\" (1.867 m).    Weight as of this encounter: 201 lb 14.4 oz (91.6 kg).  Medication Reconciliation: complete      Health Maintenance that is potentially due pending provider review:  NONE    n/a    NINA Newman  "

## 2018-07-18 LAB
ALBUMIN SERPL-MCNC: 3.9 G/DL (ref 3.4–5)
ALP SERPL-CCNC: 39 U/L (ref 40–150)
ALT SERPL W P-5'-P-CCNC: 29 U/L (ref 0–70)
ANION GAP SERPL CALCULATED.3IONS-SCNC: 8 MMOL/L (ref 3–14)
AST SERPL W P-5'-P-CCNC: 18 U/L (ref 0–45)
BILIRUB SERPL-MCNC: 0.3 MG/DL (ref 0.2–1.3)
BUN SERPL-MCNC: 19 MG/DL (ref 7–30)
CALCIUM SERPL-MCNC: 8.6 MG/DL (ref 8.5–10.1)
CHLORIDE SERPL-SCNC: 105 MMOL/L (ref 94–109)
CO2 SERPL-SCNC: 26 MMOL/L (ref 20–32)
CREAT SERPL-MCNC: 0.91 MG/DL (ref 0.66–1.25)
GFR SERPL CREATININE-BSD FRML MDRD: >90 ML/MIN/1.7M2
GLUCOSE SERPL-MCNC: 90 MG/DL (ref 70–99)
POTASSIUM SERPL-SCNC: 4 MMOL/L (ref 3.4–5.3)
PROT SERPL-MCNC: 7.1 G/DL (ref 6.8–8.8)
SODIUM SERPL-SCNC: 139 MMOL/L (ref 133–144)

## 2018-07-18 NOTE — PROGRESS NOTES
Send lab & letter      -Liver and gallbladder tests are normal. (ALT,AST, Alk phos, bilirubin), kidney function is normal (Cr, GFR), Sodium is normal, Potassium is normal, Calcium is normal, Glucose is high but fasting glucose reflects more accurately diabetes screening test    Please keep us posted with questions or concerns .      Best Regards,    Martina Gillis MD  [facility]  [phone]

## 2018-07-23 ENCOUNTER — TELEPHONE (OUTPATIENT)
Dept: FAMILY MEDICINE | Facility: CLINIC | Age: 38
End: 2018-07-23

## 2018-07-23 NOTE — TELEPHONE ENCOUNTER
VM let  Yes- glucose is normal- no concerns    If nodule not tender- no more antibiotic    If more concerns return office visit next week

## 2018-07-23 NOTE — TELEPHONE ENCOUNTER
Reason for Call:  Request for results:    Name of test or procedure: Glucose    Date of test of procedure: 7/17/18    Location of the test or procedure: [place]    OK to leave the result message on voice mail or with a family member? YES    Phone number Patient can be reached at:  Home number on file [phone] (home)    Additional comments: Pt would like to know if his glucose is high    Call taken on 7/23/2018 at 4:09 PM by Rajani Cohen

## 2018-07-23 NOTE — TELEPHONE ENCOUNTER
Dr Holcomb,  Patient called wanting to know glucose result. He did receive your letter.  Was not fasting and has no further questions.    You gave him Keflex for folliculitis.    He completed 5 days course.  Bump is still there.  Does he need to take more medication?   Please advise.  Thanks, Kiersten Caceres RN

## 2018-07-30 ENCOUNTER — TELEPHONE (OUTPATIENT)
Dept: FAMILY MEDICINE | Facility: CLINIC | Age: 38
End: 2018-07-30

## 2018-07-30 DIAGNOSIS — L73.9 FOLLICULITIS: ICD-10-CM

## 2018-07-30 NOTE — TELEPHONE ENCOUNTER
Reason for Call:  Other     Detailed comments: Pt is calling because after taking the medication he got prescribed in his last visit he is still having problems with the ingrowed hair.  Pt would like advise    Phone Number Patient can be reached at: Home number on file [phone] (home)    Best Time: Any time    Can we leave a detailed message on this number? YES    Call taken on 7/30/2018 at 4:45 PM by Mary Marcus

## 2018-07-31 RX ORDER — CEPHALEXIN 500 MG/1
500 CAPSULE ORAL 2 TIMES DAILY
Qty: 14 CAPSULE | Refills: 0 | Status: SHIPPED | OUTPATIENT
Start: 2018-07-31 | End: 2018-08-07

## 2018-07-31 NOTE — TELEPHONE ENCOUNTER
A.S.  Pt's folliculitis is not completely cleared, says it is improved, not painful, and not as hard, but still present. Completed 5 days of Keflex about a week ago. Wondering if you would send in another round of antibiotics?  Please advise,  Juany Quesada, RN

## 2018-07-31 NOTE — TELEPHONE ENCOUNTER
Please call patient  Yes-as it  improved - then take another 1 week   - cephALEXin (KEFLEX) 500 MG capsule; Take 1 capsule (500 mg) by mouth 2 times daily for 7 days  Dispense: 14 capsule; Refill: 0  Follow up if not better as needed    thanks

## 2019-02-13 DIAGNOSIS — B00.1 RECURRENT COLD SORES: ICD-10-CM

## 2019-02-14 RX ORDER — VALACYCLOVIR HYDROCHLORIDE 1 G/1
TABLET, FILM COATED ORAL
Qty: 30 TABLET | Refills: 5 | Status: SHIPPED | OUTPATIENT
Start: 2019-02-14 | End: 2023-08-02

## 2019-02-14 NOTE — TELEPHONE ENCOUNTER
"Requested Prescriptions   Pending Prescriptions Disp Refills     valACYclovir (VALTREX) 1000 mg tablet [Pharmacy Med Name: VALACYCLOVIR 1GM TABLETS] 30 tablet 0     Sig: TAKE 2 TABLETS(2000 MG) BY MOUTH TWICE DAILY    Antivirals for Herpes Protocol Passed - 2/13/2019  5:31 AM       Passed - Patient is age 12 or older       Passed - Recent (12 mo) or future (30 days) visit within the authorizing provider's specialty    Patient had office visit in the last 12 months or has a visit in the next 30 days with authorizing provider or within the authorizing provider's specialty.  See \"Patient Info\" tab in inbasket, or \"Choose Columns\" in Meds & Orders section of the refill encounter.             Passed - Medication is active on med list       Passed - Normal serum creatinine on file in past 12 months    Recent Labs   Lab Test 07/17/18  1640   CR 0.91             Prescription approved per [facility] Refill Protocol.  "

## 2019-10-24 ENCOUNTER — OFFICE VISIT (OUTPATIENT)
Dept: FAMILY MEDICINE | Facility: CLINIC | Age: 39
End: 2019-10-24
Payer: COMMERCIAL

## 2019-10-24 VITALS
OXYGEN SATURATION: 100 % | BODY MASS INDEX: 25.8 KG/M2 | DIASTOLIC BLOOD PRESSURE: 82 MMHG | WEIGHT: 201 LBS | RESPIRATION RATE: 15 BRPM | HEART RATE: 78 BPM | TEMPERATURE: 98.5 F | SYSTOLIC BLOOD PRESSURE: 128 MMHG | HEIGHT: 74 IN

## 2019-10-24 DIAGNOSIS — Z00.00 ROUTINE GENERAL MEDICAL EXAMINATION AT A HEALTH CARE FACILITY: Primary | ICD-10-CM

## 2019-10-24 DIAGNOSIS — Z11.3 SCREEN FOR STD (SEXUALLY TRANSMITTED DISEASE): ICD-10-CM

## 2019-10-24 DIAGNOSIS — F41.9 ANXIETY: ICD-10-CM

## 2019-10-24 DIAGNOSIS — L73.9 FOLLICULITIS: ICD-10-CM

## 2019-10-24 DIAGNOSIS — F17.290 CIGAR SMOKER: ICD-10-CM

## 2019-10-24 DIAGNOSIS — B00.1 RECURRENT COLD SORES: ICD-10-CM

## 2019-10-24 DIAGNOSIS — G47.419 PRIMARY NARCOLEPSY WITHOUT CATAPLEXY: ICD-10-CM

## 2019-10-24 PROCEDURE — 90471 IMMUNIZATION ADMIN: CPT | Performed by: FAMILY MEDICINE

## 2019-10-24 PROCEDURE — 87591 N.GONORRHOEAE DNA AMP PROB: CPT | Performed by: FAMILY MEDICINE

## 2019-10-24 PROCEDURE — 36415 COLL VENOUS BLD VENIPUNCTURE: CPT | Performed by: FAMILY MEDICINE

## 2019-10-24 PROCEDURE — 86803 HEPATITIS C AB TEST: CPT | Performed by: FAMILY MEDICINE

## 2019-10-24 PROCEDURE — 99395 PREV VISIT EST AGE 18-39: CPT | Mod: 25 | Performed by: FAMILY MEDICINE

## 2019-10-24 PROCEDURE — 86780 TREPONEMA PALLIDUM: CPT | Performed by: FAMILY MEDICINE

## 2019-10-24 PROCEDURE — 87389 HIV-1 AG W/HIV-1&-2 AB AG IA: CPT | Performed by: FAMILY MEDICINE

## 2019-10-24 PROCEDURE — 90686 IIV4 VACC NO PRSV 0.5 ML IM: CPT | Performed by: FAMILY MEDICINE

## 2019-10-24 PROCEDURE — 87491 CHLMYD TRACH DNA AMP PROBE: CPT | Performed by: FAMILY MEDICINE

## 2019-10-24 ASSESSMENT — MIFFLIN-ST. JEOR: SCORE: 1898.54

## 2019-10-24 NOTE — PROGRESS NOTES
SUBJECTIVE:   CC: Roddy Petit is an 39 year old male who presents for preventative health visit.     Healthy Habits:     Getting at least 3 servings of Calcium per day:  Yes    Bi-annual eye exam:  Yes    Dental care twice a year:  Yes    Sleep apnea or symptoms of sleep apnea:  None    Diet:  Regular (no restrictions)    Frequency of exercise:  4-5 days/week    Duration of exercise:  30-45 minutes    Taking medications regularly:  Yes    Medication side effects:  None    PHQ-2 Total Score: 0    Additional concerns today:  Yes    Wants sexually transmitted infection screen  Wondering about a scab in left gluteal region- had wood tick bite, and removed    Abilify 5 mg since  Fall 2018-  helps with anxiety- under care of psychiatry./divorce finalized in June 2019/counseling helps time to time.  he denies suicidal thoughts or ideation.reports no side effects from medications. Would like to continue.          Today's PHQ-2 Score:   PHQ-2 ( 1999 Pfizer) 10/24/2019   Q1: Little interest or pleasure in doing things 0   Q2: Feeling down, depressed or hopeless 0   PHQ-2 Score 0   Q1: Little interest or pleasure in doing things Not at all   Q2: Feeling down, depressed or hopeless Not at all   PHQ-2 Score 0       Abuse: Current or Past(Physical, Sexual or Emotional)- No  Do you feel safe in your environment? Yes    Social History     Tobacco Use     Smoking status: Current Some Day Smoker     Types: Cigars     Smokeless tobacco: Never Used   Substance Use Topics     Alcohol use: No     Alcohol/week: 0.0 standard drinks     If you drink alcohol do you typically have >3 drinks per day or >7 drinks per week? No    Alcohol Use 10/24/2019   Prescreen: >3 drinks/day or >7 drinks/week? Not Applicable   Prescreen: >3 drinks/day or >7 drinks/week? -   No flowsheet data found.    Last PSA: No results found for: PSA    Reviewed orders with patient. Reviewed health maintenance and updated orders accordingly - Yes  BP Readings from Last  "3 Encounters:   10/24/19 128/82   07/17/18 126/82   02/20/18 122/78    Wt Readings from Last 3 Encounters:   10/24/19 91.2 kg (201 lb)   07/17/18 91.6 kg (201 lb 14.4 oz)   02/20/18 93.7 kg (206 lb 8 oz)                    Reviewed and updated as needed this visit by clinical staff  Tobacco  Allergies  Meds  Problems         Reviewed and updated as needed this visit by Provider  Problems            Review of Systems  CONSTITUTIONAL: NEGATIVE for fever, chills, change in weight  INTEGUMENTARY/SKIN: NEGATIVE for worrisome rashes, moles or lesions  EYES: NEGATIVE for vision changes or irritation  ENT: NEGATIVE for ear, mouth and throat problems  RESP: NEGATIVE for significant cough or SOB  CV: NEGATIVE for chest pain, palpitations or peripheral edema  GI: NEGATIVE for nausea, abdominal pain, heartburn, or change in bowel habits   male: negative for dysuria, hematuria, decreased urinary stream, erectile dysfunction, urethral discharge  MUSCULOSKELETAL: NEGATIVE for significant arthralgias or myalgia  NEURO: NEGATIVE for weakness, dizziness or paresthesias  PSYCHIATRIC: NEGATIVE for changes in mood or affect    OBJECTIVE:   /82   Pulse 78   Temp 98.5  F (36.9  C) (Oral)   Resp 15   Ht 1.867 m (6' 1.5\")   Wt 91.2 kg (201 lb)   SpO2 100%   BMI 26.16 kg/m      Physical Exam  GENERAL: healthy, alert and no distress  EYES: Eyes grossly normal to inspection, PERRL and conjunctivae and sclerae normal  HENT: ear canals and TM's normal, nose and mouth without ulcers or lesions  NECK: no adenopathy, no asymmetry, masses, or scars and thyroid normal to palpation  RESP: lungs clear to auscultation - no rales, rhonchi or wheezes  CV: regular rate and rhythm, normal S1 S2, no S3 or S4, no murmur, click or rub, no peripheral edema and peripheral pulses strong  ABDOMEN: soft, nontender, no hepatosplenomegaly, no masses and bowel sounds normal  MS: no gross musculoskeletal defects noted, no edema  SKIN: no suspicious " "lesions or rashes  NEURO: Normal strength and tone, mentation intact and speech normal  PSYCH: mentation appears normal, affect normal/bright    Diagnostic Test Results:  Labs reviewed in Epic    ASSESSMENT/PLAN:   1. Routine general medical examination at a health care facility      2. Screen for STD (sexually transmitted disease)  - Chlamydia trachomatis PCR  - Hepatitis C antibody  - HIV Antigen Antibody Combo  - Neisseria gonorrhoeae PCR  - Treponema Abs w Reflex to RPR and Titer    3. Folliculitis  Over the counter neosporin twice daily for 1 week  Follow-up[ as needed        4. Primary narcolepsy without cataplexy  5. Anxiety  Under care of the pscy.  No medications changes      6. Cigar smoker  encouraged cessation.   In precontemplataion phase. Will discuss it further with psych    7. Recurrent cold sores  Valtrex as needed - does not need refill      COUNSELING:   Reviewed preventive health counseling, as reflected in patient instructions       Regular exercise       Healthy diet/nutrition    Estimated body mass index is 26.16 kg/m  as calculated from the following:    Height as of this encounter: 1.867 m (6' 1.5\").    Weight as of this encounter: 91.2 kg (201 lb).          reports that he has been smoking cigars. He has never used smokeless tobacco.  Tobacco Cessation Action Plan: Information offered: Patient not interested at this time    Counseling Resources:  ATP IV Guidelines  Pooled Cohorts Equation Calculator  FRAX Risk Assessment  ICSI Preventive Guidelines  Dietary Guidelines for Americans, 2010  USDA's MyPlate  ASA Prophylaxis  Lung CA Screening    Martina Gillis MD  [facility]  "

## 2019-10-24 NOTE — NURSING NOTE
"Chief Complaint   Patient presents with     Physical     /82   Pulse 78   Temp 98.5  F (36.9  C) (Oral)   Resp 15   Ht 1.867 m (6' 1.5\")   Wt 91.2 kg (201 lb)   SpO2 100%   BMI 26.16 kg/m   Estimated body mass index is 26.16 kg/m  as calculated from the following:    Height as of this encounter: 1.867 m (6' 1.5\").    Weight as of this encounter: 91.2 kg (201 lb).  Medication Reconciliation: complete        Health Maintenance Due Pending Provider Review:  NONE    n/a    Danni Meneses MA  [facility]  [phone]  "

## 2019-10-25 LAB
HCV AB SERPL QL IA: NONREACTIVE
HIV 1+2 AB+HIV1 P24 AG SERPL QL IA: NONREACTIVE
T PALLIDUM AB SER QL: NONREACTIVE

## 2019-10-27 LAB
C TRACH DNA SPEC QL NAA+PROBE: NEGATIVE
N GONORRHOEA DNA SPEC QL NAA+PROBE: NEGATIVE
SPECIMEN SOURCE: NORMAL
SPECIMEN SOURCE: NORMAL

## 2023-08-02 ENCOUNTER — OFFICE VISIT (OUTPATIENT)
Dept: FAMILY MEDICINE | Facility: OTHER | Age: 43
End: 2023-08-02
Attending: FAMILY MEDICINE
Payer: COMMERCIAL

## 2023-08-02 VITALS
RESPIRATION RATE: 20 BRPM | TEMPERATURE: 98.2 F | HEIGHT: 74 IN | HEART RATE: 78 BPM | SYSTOLIC BLOOD PRESSURE: 122 MMHG | DIASTOLIC BLOOD PRESSURE: 80 MMHG | OXYGEN SATURATION: 98 % | BODY MASS INDEX: 25.9 KG/M2 | WEIGHT: 201.8 LBS

## 2023-08-02 DIAGNOSIS — G47.419 PRIMARY NARCOLEPSY WITHOUT CATAPLEXY: ICD-10-CM

## 2023-08-02 DIAGNOSIS — Z13.6 CARDIOVASCULAR SCREENING; LDL GOAL LESS THAN 160: ICD-10-CM

## 2023-08-02 DIAGNOSIS — Z02.89 HEALTH EXAMINATION OF DEFINED SUBPOPULATION: Primary | ICD-10-CM

## 2023-08-02 LAB
ANION GAP SERPL CALCULATED.3IONS-SCNC: 6 MMOL/L (ref 7–15)
BUN SERPL-MCNC: 19.5 MG/DL (ref 6–20)
CALCIUM SERPL-MCNC: 9.7 MG/DL (ref 8.6–10)
CHLORIDE SERPL-SCNC: 100 MMOL/L (ref 98–107)
CHOLEST SERPL-MCNC: 182 MG/DL
CREAT SERPL-MCNC: 0.89 MG/DL (ref 0.67–1.17)
DEPRECATED HCO3 PLAS-SCNC: 29 MMOL/L (ref 22–29)
GFR SERPL CREATININE-BSD FRML MDRD: >90 ML/MIN/1.73M2
GLUCOSE SERPL-MCNC: 85 MG/DL (ref 70–99)
HDLC SERPL-MCNC: 64 MG/DL
LDLC SERPL CALC-MCNC: 83 MG/DL
NONHDLC SERPL-MCNC: 118 MG/DL
POTASSIUM SERPL-SCNC: 4.2 MMOL/L (ref 3.4–5.3)
SODIUM SERPL-SCNC: 135 MMOL/L (ref 136–145)
TRIGL SERPL-MCNC: 176 MG/DL

## 2023-08-02 PROCEDURE — 99386 PREV VISIT NEW AGE 40-64: CPT | Performed by: FAMILY MEDICINE

## 2023-08-02 PROCEDURE — 36415 COLL VENOUS BLD VENIPUNCTURE: CPT | Mod: ZL | Performed by: FAMILY MEDICINE

## 2023-08-02 PROCEDURE — 82310 ASSAY OF CALCIUM: CPT | Mod: ZL | Performed by: FAMILY MEDICINE

## 2023-08-02 PROCEDURE — 80061 LIPID PANEL: CPT | Mod: ZL | Performed by: FAMILY MEDICINE

## 2023-08-02 RX ORDER — DEXTROAMPHETAMINE SACCHARATE, AMPHETAMINE ASPARTATE, DEXTROAMPHETAMINE SULFATE AND AMPHETAMINE SULFATE 7.5; 7.5; 7.5; 7.5 MG/1; MG/1; MG/1; MG/1
1.5 TABLET ORAL
COMMUNITY
Start: 2023-07-27

## 2023-08-02 ASSESSMENT — ENCOUNTER SYMPTOMS
EYE PAIN: 0
FREQUENCY: 0
MYALGIAS: 0
HEMATOCHEZIA: 0
DYSURIA: 0
CHILLS: 0
PALPITATIONS: 0
NERVOUS/ANXIOUS: 0
HEARTBURN: 0
ARTHRALGIAS: 0
ABDOMINAL PAIN: 0
DIZZINESS: 0
CONSTIPATION: 0
HEADACHES: 0
SHORTNESS OF BREATH: 0
WEAKNESS: 0
DIARRHEA: 0
COUGH: 0
PARESTHESIAS: 0
HEMATURIA: 0
SORE THROAT: 0
JOINT SWELLING: 0
NAUSEA: 0
FEVER: 0

## 2023-08-02 ASSESSMENT — PAIN SCALES - GENERAL: PAINLEVEL: NO PAIN (0)

## 2023-08-02 NOTE — LETTER
August 3, 2023      Roddy Petit  [address]  [address]  [place] [facility] [address]        Dear ,    We are writing to inform you of your test results.    The 10-year ASCVD risk score (Ven[place] BAUTISTA, et al., 2019) is: 2.2%    Values used to calculate the score:      Age: 43 years      Sex: Male      Is Non- : No      Diabetic: No      Tobacco smoker: Yes      Systolic Blood Pressure: 122 mmHg      Is BP treated: No      HDL Cholesterol: 64 mg/dL      Total Cholesterol: 182 mg/dL  As you can see your cardiac risk score was 2.2%.  This indicates the chance of you having a heart attack in the next 10 years.  This is fairly low and would continue monitoring it every few years.    Resulted Orders   Basic Metabolic Panel   Result Value Ref Range    Sodium 135 (L) 136 - 145 mmol/L    Potassium 4.2 3.4 - 5.3 mmol/L    Chloride 100 98 - 107 mmol/L    Carbon Dioxide (CO2) 29 22 - 29 mmol/L    Anion Gap 6 (L) 7 - 15 mmol/L    Urea Nitrogen 19.5 6.0 - 20.0 mg/dL    Creatinine 0.89 0.67 - 1.17 mg/dL    Calcium 9.7 8.6 - 10.0 mg/dL    Glucose 85 70 - 99 mg/dL    GFR Estimate >90 >60 mL/min/1.73m2   Lipid Panel   Result Value Ref Range    Cholesterol 182 <200 mg/dL    Triglycerides 176 (H) <150 mg/dL    Direct Measure HDL 64 >=40 mg/dL    LDL Cholesterol Calculated 83 <=100 mg/dL    Non HDL Cholesterol 118 <130 mg/dL    Narrative    Cholesterol  Desirable:  <200 mg/dL    Triglycerides  Normal:  Less than 150 mg/dL  Borderline High:  150-199 mg/dL  High:  200-499 mg/dL  Very High:  Greater than or equal to 500 mg/dL    Direct Measure HDL  Female:  Greater than or equal to 50 mg/dL   Male:  Greater than or equal to 40 mg/dL    LDL Cholesterol  Desirable:  <100mg/dL  Above Desirable:  100-129 mg/dL   Borderline High:  130-159 mg/dL   High:  160-189 mg/dL   Very High:  >= 190 mg/dL    Non HDL Cholesterol  Desirable:  130 mg/dL  Above Desirable:  130-159 mg/dL  Borderline High:  160-189 mg/dL  High:  190-219  mg/dL  Very High:  Greater than or equal to 220 mg/dL       If you have any questions or concerns, please call the clinic at the number listed above.       Sincerely,      Vincent Welch MD

## 2023-08-02 NOTE — NURSING NOTE
Patient here for physical , last eye exam 2021 and last dental exam July 2023. Medication Reconciliation: complete.    Mariela Nieto LPN  8/2/2023 3:32 PM

## 2023-08-03 ASSESSMENT — ENCOUNTER SYMPTOMS
COUGH: 0
HEARTBURN: 0
DIZZINESS: 0
CHILLS: 0
DYSURIA: 0
HEADACHES: 0
CONSTIPATION: 0
HEMATOCHEZIA: 0
PARESTHESIAS: 0
SHORTNESS OF BREATH: 0
NAUSEA: 0
NERVOUS/ANXIOUS: 0
WEAKNESS: 0
HEMATURIA: 0
JOINT SWELLING: 0
FEVER: 0
EYE PAIN: 0
DIARRHEA: 0
FREQUENCY: 0
ABDOMINAL PAIN: 0
SORE THROAT: 0
MYALGIAS: 0
PALPITATIONS: 0
ARTHRALGIAS: 0

## 2023-08-03 NOTE — PROGRESS NOTES
SUBJECTIVE:   Roddy Petit is a 43 year old male who presents to clinic today for the following health issues: Physical    Patient arrives here for physical.  He recently moved here from [place].  Currently has no concerns.  Does see psychiatry for his Adderall.  He does have a history of narcolepsy    Healthy Habits:     Getting at least 3 servings of Calcium per day:  Yes    Bi-annual eye exam:  NO    Dental care twice a year:  Yes    Sleep apnea or symptoms of sleep apnea:  None    Diet:  Regular (no restrictions)    Frequency of exercise:  6-7 days/week    Duration of exercise:  Greater than 60 minutes    Taking medications regularly:  Yes    Medication side effects:  None    Additional concerns today:  No        Patient Active Problem List    Diagnosis Date Noted    Anxiety 10/24/2019     Priority: Medium     stable on abilify-under care of        Cigar smoker 10/24/2019     Priority: Medium    Recurrent cold sores 10/24/2019     Priority: Medium    Folliculitis 10/24/2019     Priority: Medium    Screen for STD (sexually transmitted disease) 10/24/2019     Priority: Medium    Primary narcolepsy without cataplexy 06/20/2017     Priority: Medium     stable on adderall under care of        CARDIOVASCULAR SCREENING; LDL GOAL LESS THAN 160 09/10/2013     Priority: Medium    History of alcoholism (H) 06/06/2008     Priority: Medium     Since 2009- sobriety         Review of Systems   Constitutional:  Negative for chills and fever.   HENT:  Negative for congestion, ear pain, hearing loss and sore throat.    Eyes:  Negative for pain and visual disturbance.   Respiratory:  Negative for cough and shortness of breath.    Cardiovascular:  Negative for chest pain, palpitations and peripheral edema.   Gastrointestinal:  Negative for abdominal pain, constipation, diarrhea, heartburn, hematochezia and nausea.   Genitourinary:  Negative for dysuria, frequency, genital sores, hematuria, impotence,  "penile discharge and urgency.   Musculoskeletal:  Negative for arthralgias, joint swelling and myalgias.   Skin:  Negative for rash.   Neurological:  Negative for dizziness, weakness, headaches and paresthesias.   Psychiatric/Behavioral:  Negative for mood changes. The patient is not nervous/anxious.         OBJECTIVE:     /80   Pulse 78   Temp 98.2  F (36.8  C)   Resp 20   Ht 1.867 m (6' 1.5\")   Wt 91.5 kg (201 lb 12.8 oz)   SpO2 98%   BMI 26.26 kg/m    Body mass index is 26.26 kg/m .  Physical Exam    Diagnostic Test Results:  Results for orders placed or performed in visit on 08/02/23 (from the past 24 hour(s))   Basic Metabolic Panel   Result Value Ref Range    Sodium 135 (L) 136 - 145 mmol/L    Potassium 4.2 3.4 - 5.3 mmol/L    Chloride 100 98 - 107 mmol/L    Carbon Dioxide (CO2) 29 22 - 29 mmol/L    Anion Gap 6 (L) 7 - 15 mmol/L    Urea Nitrogen 19.5 6.0 - 20.0 mg/dL    Creatinine 0.89 0.67 - 1.17 mg/dL    Calcium 9.7 8.6 - 10.0 mg/dL    Glucose 85 70 - 99 mg/dL    GFR Estimate >90 >60 mL/min/1.73m2   Lipid Panel   Result Value Ref Range    Cholesterol 182 <200 mg/dL    Triglycerides 176 (H) <150 mg/dL    Direct Measure HDL 64 >=40 mg/dL    LDL Cholesterol Calculated 83 <=100 mg/dL    Non HDL Cholesterol 118 <130 mg/dL    Narrative    Cholesterol  Desirable:  <200 mg/dL    Triglycerides  Normal:  Less than 150 mg/dL  Borderline High:  150-199 mg/dL  High:  200-499 mg/dL  Very High:  Greater than or equal to 500 mg/dL    Direct Measure HDL  Female:  Greater than or equal to 50 mg/dL   Male:  Greater than or equal to 40 mg/dL    LDL Cholesterol  Desirable:  <100mg/dL  Above Desirable:  100-129 mg/dL   Borderline High:  130-159 mg/dL   High:  160-189 mg/dL   Very High:  >= 190 mg/dL    Non HDL Cholesterol  Desirable:  130 mg/dL  Above Desirable:  130-159 mg/dL  Borderline High:  160-189 mg/dL  High:  190-219 mg/dL  Very High:  Greater than or equal to 220 mg/dL       ASSESSMENT/PLAN:         (Z02.89) " Health examination of defined subpopulation  (primary encounter diagnosis)  Comment: Satisfactory  Plan: Lipid Panel, Basic Metabolic Panel            (Z13.6) CARDIOVASCULAR SCREENING; LDL GOAL LESS THAN 160  Comment:   Plan:   The 10-year ASCVD risk score (Ven[place] BAUTISTA, et al., 2019) is: 2.2%    Values used to calculate the score:      Age: 43 years      Sex: Male      Is Non- : No      Diabetic: No      Tobacco smoker: Yes      Systolic Blood Pressure: 122 mmHg      Is BP treated: No      HDL Cholesterol: 64 mg/dL      Total Cholesterol: 182 mg/dL  Very low cardiac risk score.  Continue to monitor    (G47.419) Primary narcolepsy without cataplexy  Comment:   Plan:       Vincent Welch MD  [facility]

## 2024-08-21 ENCOUNTER — OFFICE VISIT (OUTPATIENT)
Dept: FAMILY MEDICINE | Facility: OTHER | Age: 44
End: 2024-08-21
Attending: FAMILY MEDICINE
Payer: COMMERCIAL

## 2024-08-21 VITALS
WEIGHT: 199.6 LBS | RESPIRATION RATE: 20 BRPM | DIASTOLIC BLOOD PRESSURE: 80 MMHG | HEART RATE: 76 BPM | SYSTOLIC BLOOD PRESSURE: 132 MMHG | HEIGHT: 73 IN | OXYGEN SATURATION: 97 % | TEMPERATURE: 97.4 F | BODY MASS INDEX: 26.45 KG/M2

## 2024-08-21 DIAGNOSIS — G47.419 PRIMARY NARCOLEPSY WITHOUT CATAPLEXY: ICD-10-CM

## 2024-08-21 DIAGNOSIS — Z00.00 ROUTINE GENERAL MEDICAL EXAMINATION AT A HEALTH CARE FACILITY: Primary | ICD-10-CM

## 2024-08-21 PROCEDURE — 99396 PREV VISIT EST AGE 40-64: CPT | Mod: 25 | Performed by: FAMILY MEDICINE

## 2024-08-21 PROCEDURE — 90715 TDAP VACCINE 7 YRS/> IM: CPT | Performed by: FAMILY MEDICINE

## 2024-08-21 PROCEDURE — 90471 IMMUNIZATION ADMIN: CPT | Performed by: FAMILY MEDICINE

## 2024-08-21 SDOH — HEALTH STABILITY: PHYSICAL HEALTH: ON AVERAGE, HOW MANY DAYS PER WEEK DO YOU ENGAGE IN MODERATE TO STRENUOUS EXERCISE (LIKE A BRISK WALK)?: 7 DAYS

## 2024-08-21 SDOH — HEALTH STABILITY: PHYSICAL HEALTH: ON AVERAGE, HOW MANY MINUTES DO YOU ENGAGE IN EXERCISE AT THIS LEVEL?: 40 MIN

## 2024-08-21 ASSESSMENT — ANXIETY QUESTIONNAIRES
1. FEELING NERVOUS, ANXIOUS, OR ON EDGE: NOT AT ALL
6. BECOMING EASILY ANNOYED OR IRRITABLE: NOT AT ALL
5. BEING SO RESTLESS THAT IT IS HARD TO SIT STILL: NOT AT ALL
2. NOT BEING ABLE TO STOP OR CONTROL WORRYING: NOT AT ALL
GAD7 TOTAL SCORE: 0
7. FEELING AFRAID AS IF SOMETHING AWFUL MIGHT HAPPEN: NOT AT ALL
8. IF YOU CHECKED OFF ANY PROBLEMS, HOW DIFFICULT HAVE THESE MADE IT FOR YOU TO DO YOUR WORK, TAKE CARE OF THINGS AT HOME, OR GET ALONG WITH OTHER PEOPLE?: NOT DIFFICULT AT ALL
IF YOU CHECKED OFF ANY PROBLEMS ON THIS QUESTIONNAIRE, HOW DIFFICULT HAVE THESE PROBLEMS MADE IT FOR YOU TO DO YOUR WORK, TAKE CARE OF THINGS AT HOME, OR GET ALONG WITH OTHER PEOPLE: NOT DIFFICULT AT ALL
4. TROUBLE RELAXING: NOT AT ALL
7. FEELING AFRAID AS IF SOMETHING AWFUL MIGHT HAPPEN: NOT AT ALL
GAD7 TOTAL SCORE: 0
GAD7 TOTAL SCORE: 0
3. WORRYING TOO MUCH ABOUT DIFFERENT THINGS: NOT AT ALL

## 2024-08-21 ASSESSMENT — SOCIAL DETERMINANTS OF HEALTH (SDOH): HOW OFTEN DO YOU GET TOGETHER WITH FRIENDS OR RELATIVES?: TWICE A WEEK

## 2024-08-21 ASSESSMENT — PAIN SCALES - GENERAL: PAINLEVEL: NO PAIN (0)

## 2024-08-21 NOTE — NURSING NOTE
Patient here for annual physical, last eye exam 2023 and last dental exam 6 months prior. Medication Reconciliation: complete.    Mariela Nieto LPN  8/21/2024 2:44 PM

## 2024-08-21 NOTE — PROGRESS NOTES
"Preventive Care Visit  [facility]  Vincent Welch MD, Family Medicine  Aug 21, 2024      Assessment & Plan     Routine general medical examination at a health care facility  Satisfactory tenderness given  Patient does have questions about vasectomy.  Advised him we do have urology and when he is ready to make an appoint with urology.  States he is not yet ready due to the fall and hunting.  If he has trouble scheduling appointment with urology he will call    Primary narcolepsy without cataplexy  Per Dr. Shaji Parker's            Nicotine/Tobacco Cessation  He reports that he has been smoking cigars. He has quit using smokeless tobacco.  Nicotine/Tobacco Cessation Plan        BMI  Estimated body mass index is 26.33 kg/m  as calculated from the following:    Height as of this encounter: 1.854 m (6' 1\").    Weight as of this encounter: 90.5 kg (199 lb 9.6 oz).       Counseling  Appropriate preventive services were addressed with this patient via screening, questionnaire, or discussion as appropriate for fall prevention, nutrition, physical activity, Tobacco-use cessation, social engagement, weight loss and cognition.  Checklist reviewing preventive services available has been given to the patient.  Reviewed patient's diet, addressing concerns and/or questions.           No follow-ups on file.    Nury Pereyra is a 44 year old, presenting for the following:  Physical         Health Care Directive  Patient does not have a Health Care Directive or Living Will: Discussed advance care planning with patient; information given to patient to review.    HPI  Patient arrives here for physical.  He currently has no complaints.  He does get his Adderall on in the [employer].            8/21/2024   General Health   How would you rate your overall physical health? Excellent   Feel stress (tense, anxious, or unable to sleep) Not at all            8/21/2024   Nutrition   Three or more servings of calcium " each day? Yes   Diet: Regular (no restrictions)   How many servings of fruit and vegetables per day? (!) 2-3   How many sweetened beverages each day? 0-1            8/21/2024   Exercise   Days per week of moderate/strenous exercise 7 days   Average minutes spent exercising at this level 40 min            8/21/2024   Social Factors   Frequency of gathering with friends or relatives Twice a week   Worry food won't last until get money to buy more No   Food not last or not have enough money for food? No   Do you have housing? (Housing is defined as stable permanent housing and does not include staying ouside in a car, in a tent, in an abandoned building, in an overnight shelter, or couch-surfing.) Yes   Are you worried about losing your housing? No   Lack of transportation? No   Unable to get utilities (heat,electricity)? No            8/21/2024   Dental   Dentist two times every year? Yes            8/21/2024   TB Screening   Were you born outside of the [place]? No            Today's PHQ-2 Score:       8/21/2024     2:32 PM   PHQ-2 ( 1999 Pfizer)   Q1: Little interest or pleasure in doing things 0   Q2: Feeling down, depressed or hopeless 0   PHQ-2 Score 0   Q1: Little interest or pleasure in doing things Not at all   Q2: Feeling down, depressed or hopeless Not at all   PHQ-2 Score 0           8/21/2024   Substance Use   Alcohol more than 3/day or more than 7/wk Not Applicable   Do you use any other substances recreationally? No        Social History     Tobacco Use    Smoking status: Some Days     Types: Cigars    Smokeless tobacco: Former   Vaping Use    Vaping status: Former   Substance Use Topics    Alcohol use: No     Alcohol/week: 0.0 standard drinks of alcohol    Drug use: No           8/21/2024   STI Screening   New sexual partner(s) since last STI/HIV test? No      ASCVD Risk   The 10-year ASCVD risk score ([place] BAUTISTA, et al., 2019) is: 2.7%    Values used to calculate the score:      Age: 44 years       "Sex: Male      Is Non- : No      Diabetic: No      Tobacco smoker: Yes      Systolic Blood Pressure: 132 mmHg      Is BP treated: No      HDL Cholesterol: 64 mg/dL      Total Cholesterol: 182 mg/dL        8/21/2024   Contraception/Family Planning   Questions about contraception or family planning (!) YES            Reviewed and updated as needed this visit by Provider                             Objective    Exam  /80   Pulse 76   Temp 97.4  F (36.3  C)   Resp 20   Ht 1.854 m (6' 1\")   Wt 90.5 kg (199 lb 9.6 oz)   SpO2 97%   BMI 26.33 kg/m     Estimated body mass index is 26.33 kg/m  as calculated from the following:    Height as of this encounter: 1.854 m (6' 1\").    Weight as of this encounter: 90.5 kg (199 lb 9.6 oz).    Physical Exam  GENERAL: alert and no distress  NECK: no adenopathy, no asymmetry, masses, or scars  RESP: lungs clear to auscultation - no rales, rhonchi or wheezes  CV: regular rate and rhythm, normal S1 S2, no S3 or S4, no murmur, click or rub, no peripheral edema  ABDOMEN: soft, nontender, no hepatosplenomegaly, no masses and bowel sounds normal  MS: no gross musculoskeletal defects noted, no edema        Signed Electronically by: Vincent Welch MD    "

## 2024-08-22 PROBLEM — B00.1 RECURRENT COLD SORES: Status: RESOLVED | Noted: 2019-10-24 | Resolved: 2024-08-22

## 2025-06-04 ENCOUNTER — OFFICE VISIT (OUTPATIENT)
Dept: FAMILY MEDICINE | Facility: OTHER | Age: 45
End: 2025-06-04
Payer: COMMERCIAL

## 2025-06-04 VITALS
OXYGEN SATURATION: 97 % | DIASTOLIC BLOOD PRESSURE: 100 MMHG | HEART RATE: 78 BPM | WEIGHT: 198 LBS | TEMPERATURE: 97.1 F | SYSTOLIC BLOOD PRESSURE: 132 MMHG | BODY MASS INDEX: 26.12 KG/M2

## 2025-06-04 DIAGNOSIS — Z91.89 AT RISK FOR SEXUALLY TRANSMITTED INFECTION DUE TO UNPROTECTED SEX: Primary | ICD-10-CM

## 2025-06-04 DIAGNOSIS — Z11.3 SCREENING EXAMINATION FOR VENEREAL DISEASE: ICD-10-CM

## 2025-06-04 LAB
C TRACH DNA SPEC QL PROBE+SIG AMP: NEGATIVE
N GONORRHOEA DNA SPEC QL NAA+PROBE: NEGATIVE
SPECIMEN TYPE: NORMAL

## 2025-06-04 PROCEDURE — 86780 TREPONEMA PALLIDUM: CPT | Mod: ZL

## 2025-06-04 PROCEDURE — 36415 COLL VENOUS BLD VENIPUNCTURE: CPT | Mod: ZL

## 2025-06-04 PROCEDURE — 87591 N.GONORRHOEAE DNA AMP PROB: CPT | Mod: ZL

## 2025-06-04 PROCEDURE — 87661 TRICHOMONAS VAGINALIS AMPLIF: CPT | Mod: ZL

## 2025-06-04 ASSESSMENT — PAIN SCALES - GENERAL: PAINLEVEL_OUTOF10: NO PAIN (0)

## 2025-06-04 NOTE — PROGRESS NOTES
ASSESSMENT/PLAN:    I have reviewed the nursing notes.  I have reviewed the findings, diagnosis, plan and need for follow up with the patient.            - May use over-the-counter Tylenol and ibuprofen as needed for pain, inflammation or fever    - Discussed warning signs/symptoms indicative of need to f/u    - Follow up if symptoms persist or worsen or concerns    - I explained my diagnostic considerations and recommendations to the patient, who voiced understanding and agreement with the treatment plan. All questions were answered. We discussed potential side effects of any prescribed or recommended therapies, as well as expectations for response to treatments.    OMAR Tam CNP  6/4/2025  3:35 PM    HPI:    Roddy Petit is a 45 year old male who presents to [facility] today for concerns of STI testing.  Patient states that he has had multiple partners since he had last been tested for any sexually transmitted infections and has had unprotected sex with some of these partners.  Patient states that all of his partners are female partners.  Patient denies any STI or urinary tract infection symptoms.  Patient does report that one of his previous partners did recently test positive for trichomonas.  Denies fever, chills, body aches, shortness of breath, chest pain, genital sores or lesions, penile discharge, urinary urgency, frequency or incontinence, low back or flank pain, abdominal pain or discomfort, hematuria, cloudy or foul-smelling urine, cough, congestion, rhinorrhea, sore throat, otalgia, headache, lightheadedness, dizziness, nausea, vomiting, diarrhea, constipation or rashes.    No past medical history on file.  No past surgical history on file.  Social History     Tobacco Use    Smoking status: Some Days     Types: Cigars    Smokeless tobacco: Former   Substance Use Topics    Alcohol use: No     Alcohol/week: 0.0 standard drinks of alcohol     Current Outpatient Medications   Medication Sig  Dispense Refill    amphetamine-dextroamphetamine (ADDERALL) 30 MG tablet Take 1.5 tablets by mouth daily at 2 pm       Allergies   Allergen Reactions    No Known Drug Allergy      Past medical history, past surgical history, current medications and allergies reviewed and accurate to the best of my knowledge.      ROS:  Refer to HPI    BP (!) 132/100 (BP Location: Right arm)   Pulse 78   Temp 97.1  F (36.2  C) (Tympanic)   Wt 89.8 kg (198 lb)   SpO2 97%   BMI 26.12 kg/m      EXAM:  General Appearance: Well appearing 45 year old male, appropriate appearance for age. No acute distress   Respiratory: normal chest wall and respirations.  Normal effort.  Clear to auscultation bilaterally, no wheezing, crackles or rhonchi.  No increased work of breathing.  No cough appreciated.  Cardiac: RRR with no murmurs  Abdomen: soft, nontender, no rigidity, no rebound tenderness or guarding, normal bowel sounds present  :  No suprapubic tenderness to palpation.  Absent CVA tenderness to palpation.    Musculoskeletal:  Equal movement of bilateral upper extremities.  Equal movement of bilateral lower extremities.  Normal gait.    Dermatological: no rashes noted of exposed skin  Neuro: Alert and oriented to person, place, and time.    Psychological: normal affect, alert, oriented, and pleasant.     Labs:  ***

## 2025-06-04 NOTE — PATIENT INSTRUCTIONS
Take care Roddy, it was a pleasure meeting you!  Safer Sex: Care Instructions  Overview  Safer sex is a way to reduce your risk of getting a sexually transmitted infection (STI). It can also help prevent pregnancy.  Several products can help you practice safer sex and reduce your chance of STIs. One of the best is a condom. There are internal and external condoms. You can use a special rubber sheet (dental dam) for protection during oral sex. Disposable gloves can keep your hands from touching blood, semen, or other body fluids that can carry infections.  Remember that birth control methods such as diaphragms, IUDs, foams, and birth control pills do not stop you from getting STIs.  Follow-up care is a key part of your treatment and safety. Be sure to make and go to all appointments, and call your doctor if you are having problems. It's also a good idea to know your test results and keep a list of the medicines you take.  How can you care for yourself at home?  Think about getting vaccinated to help prevent hepatitis A, hepatitis B, and human papillomavirus (HPV). They can be spread through sex.  Use a condom every time you have sex. Use an external condom, which goes on the penis. Or use an internal condom, which goes into the vagina or anus.  Make sure you use the right size external condom. A condom that's too small can break easily. A condom that's too big can slip off during sex.  Use a new condom each time you have sex. Be careful not to poke a hole in the condom when you open the wrapper.  Don't use an internal condom and an external condom at the same time.  Never use petroleum jelly (such as Vaseline), grease, hand lotion, baby oil, or anything with oil in it. These products can make holes in the condom.  After intercourse, hold the edge of the condom as you remove it. This will help keep semen from spilling out of the condom.  Do not have sex with anyone who has symptoms of an STI, such as sores on the  "genitals or mouth.  Do not drink a lot of alcohol or use drugs before sex.  Limit your sex partners. Sex with one partner who has sex only with you can reduce your risk of getting an STI.  Don't share sex toys. But if you do share them, use a condom and clean the sex toys between each use.  Talk to any partners before you have sex. Talk about what you feel comfortable with and whether you have any boundaries with sex. And find out if your partner or partners may be at risk for any STI. Keep in mind that a person may be able to spread an STI even if they do not have symptoms. You and any partners may want to get tested for STIs.  Where can you learn more?  Go to https://www.[employer].net/patiented  Enter B608 in the search box to learn more about \"Safer Sex: Care Instructions.\"  Current as of: April 30, 2024  Content Version: 14.4    2008-1326 [employer].   Care instructions adapted under license by your healthcare professional. If you have questions about a medical condition or this instruction, always ask your healthcare professional. [employer] disclaims any warranty or liability for your use of this information.    Safer Sex: Care Instructions  Overview  Safer sex is a way to reduce your risk of getting a sexually transmitted infection (STI). It can also help prevent pregnancy.  Several products can help you practice safer sex and reduce your chance of STIs. One of the best is a condom. There are internal and external condoms. You can use a special rubber sheet (dental dam) for protection during oral sex. Disposable gloves can keep your hands from touching blood, semen, or other body fluids that can carry infections.  Remember that birth control methods such as diaphragms, IUDs, foams, and birth control pills do not stop you from getting STIs.  Follow-up care is a key part of your treatment and safety. Be sure to make and go to all appointments, and call your doctor if you are having " "problems. It's also a good idea to know your test results and keep a list of the medicines you take.  How can you care for yourself at home?  Think about getting vaccinated to help prevent hepatitis A, hepatitis B, and human papillomavirus (HPV). They can be spread through sex.  Use a condom every time you have sex. Use an external condom, which goes on the penis. Or use an internal condom, which goes into the vagina or anus.  Make sure you use the right size external condom. A condom that's too small can break easily. A condom that's too big can slip off during sex.  Use a new condom each time you have sex. Be careful not to poke a hole in the condom when you open the wrapper.  Don't use an internal condom and an external condom at the same time.  Never use petroleum jelly (such as Vaseline), grease, hand lotion, baby oil, or anything with oil in it. These products can make holes in the condom.  After intercourse, hold the edge of the condom as you remove it. This will help keep semen from spilling out of the condom.  Do not have sex with anyone who has symptoms of an STI, such as sores on the genitals or mouth.  Do not drink a lot of alcohol or use drugs before sex.  Limit your sex partners. Sex with one partner who has sex only with you can reduce your risk of getting an STI.  Don't share sex toys. But if you do share them, use a condom and clean the sex toys between each use.  Talk to any partners before you have sex. Talk about what you feel comfortable with and whether you have any boundaries with sex. And find out if your partner or partners may be at risk for any STI. Keep in mind that a person may be able to spread an STI even if they do not have symptoms. You and any partners may want to get tested for STIs.  Where can you learn more?  Go to https://www.healthwise.net/patiented  Enter B608 in the search box to learn more about \"Safer Sex: Care Instructions.\"  Current as of: April 30, 2024  Content Version: " 14.4    8151-4503 [employer].   Care instructions adapted under license by your healthcare professional. If you have questions about a medical condition or this instruction, always ask your healthcare professional. [employer] disclaims any warranty or liability for your use of this information.

## 2025-06-05 ENCOUNTER — RESULTS FOLLOW-UP (OUTPATIENT)
Dept: FAMILY MEDICINE | Facility: OTHER | Age: 45
End: 2025-06-05

## 2025-06-05 ENCOUNTER — TELEPHONE (OUTPATIENT)
Dept: FAMILY MEDICINE | Facility: OTHER | Age: 45
End: 2025-06-05
Payer: COMMERCIAL

## 2025-06-05 LAB
T PALLIDUM AB SER QL: NONREACTIVE
T VAGINALIS DNA SPEC QL NAA+PROBE: NOT DETECTED

## 2025-06-05 NOTE — TELEPHONE ENCOUNTER
Reason for call: Request for results.    Name of test or procedure: labs    Date of test or procedure: 6/4/25    Location of test or procedure: [facility]    Preferred method for responding to this message: Telephone Call    Phone number patient can be reached at: Cell number on file:    Telephone Information:   Mobile [phone]       If we can't reach you directly, may we leave a detailed response at the number you provided?Yes    Fior Banuelos on 6/5/2025 at 12:07 PM

## 2025-06-05 NOTE — TELEPHONE ENCOUNTER
Detailed voicemail was left for patient indicating negative chlamydia and gonorrheae test and that the other blood labs are still in process, he will receive a phone call when the rest of the labs are in. He can call back with further questions.     Licha Wheeler LPN on 6/5/2025 at 2:51 PM

## 2025-07-22 ENCOUNTER — PATIENT OUTREACH (OUTPATIENT)
Dept: CARE COORDINATION | Facility: CLINIC | Age: 45
End: 2025-07-22
Payer: COMMERCIAL

## 2025-07-28 ENCOUNTER — OFFICE VISIT (OUTPATIENT)
Dept: FAMILY MEDICINE | Facility: OTHER | Age: 45
End: 2025-07-28
Attending: FAMILY MEDICINE
Payer: COMMERCIAL

## 2025-07-28 VITALS
DIASTOLIC BLOOD PRESSURE: 88 MMHG | SYSTOLIC BLOOD PRESSURE: 130 MMHG | BODY MASS INDEX: 26.33 KG/M2 | TEMPERATURE: 97.8 F | RESPIRATION RATE: 20 BRPM | HEART RATE: 84 BPM | WEIGHT: 199.6 LBS | OXYGEN SATURATION: 98 %

## 2025-07-28 DIAGNOSIS — J40 BRONCHITIS: Primary | ICD-10-CM

## 2025-07-28 DIAGNOSIS — J02.9 SORE THROAT: ICD-10-CM

## 2025-07-28 PROCEDURE — 1126F AMNT PAIN NOTED NONE PRSNT: CPT | Performed by: FAMILY MEDICINE

## 2025-07-28 PROCEDURE — 3075F SYST BP GE 130 - 139MM HG: CPT | Performed by: FAMILY MEDICINE

## 2025-07-28 PROCEDURE — 99213 OFFICE O/P EST LOW 20 MIN: CPT | Performed by: FAMILY MEDICINE

## 2025-07-28 PROCEDURE — 3079F DIAST BP 80-89 MM HG: CPT | Performed by: FAMILY MEDICINE

## 2025-07-28 PROCEDURE — G2211 COMPLEX E/M VISIT ADD ON: HCPCS | Performed by: FAMILY MEDICINE

## 2025-07-28 RX ORDER — AMOXICILLIN 875 MG/1
875 TABLET, COATED ORAL 2 TIMES DAILY
Qty: 20 TABLET | Refills: 0 | Status: SHIPPED | OUTPATIENT
Start: 2025-07-28 | End: 2025-08-07

## 2025-07-28 ASSESSMENT — PAIN SCALES - GENERAL: PAINLEVEL_OUTOF10: NO PAIN (0)

## 2025-07-28 ASSESSMENT — ENCOUNTER SYMPTOMS: SORE THROAT: 1

## 2025-07-28 NOTE — PROGRESS NOTES
SUBJECTIVE:   Roddy Petit is a 45 year old male who presents to clinic today for the following health issues:    Pharyngitis     History of Present Illness       Reason for visit:  Sore throat and cold  Symptom onset:  3-4 weeks ago  Symptoms include:  Hurt to swallow cough  Symptom intensity:  Moderate  Symptom progression:  Staying the same  Had these symptoms before:  Yes  Has tried/received treatment for these symptoms:  No  What makes it worse:  No  What makes it better:  Cough medicine   He is taking medications regularly.    Patient arrives here for cough.  Sore throat.  Is been going on since July 4.  Seems to wax and wane.  Cough is not productive.  The sore throat got worse over the last 3 to 4 days.  It has been present again since the beginning of the month.  Reflux symptoms.    Patient Active Problem List    Diagnosis Date Noted    Anxiety 10/24/2019     Priority: Medium     stable on abilify-under care of        Cigar smoker 10/24/2019     Priority: Medium    Folliculitis 10/24/2019     Priority: Medium    Screen for STD (sexually transmitted disease) 10/24/2019     Priority: Medium    Primary narcolepsy without cataplexy 06/20/2017     Priority: Medium     stable on adderall under care of        CARDIOVASCULAR SCREENING; LDL GOAL LESS THAN 160 09/10/2013     Priority: Medium    History of alcoholism (H) 06/06/2008     Priority: Medium     Since 2009- sobriety       No past medical history on file.   No past surgical history on file.    Review of Systems   HENT:  Positive for sore throat.         OBJECTIVE:     /88   Pulse 84   Temp 97.8  F (36.6  C)   Resp 20   Wt 90.5 kg (199 lb 9.6 oz)   SpO2 98%   BMI 26.33 kg/m    Body mass index is 26.33 kg/m .  Physical Exam  Constitutional:       Appearance: Normal appearance.   HENT:      Right Ear: Tympanic membrane normal.      Left Ear: Tympanic membrane normal.      Nose: No congestion.      Mouth/Throat:      Mouth:  Mucous membranes are moist.      Pharynx: No oropharyngeal exudate or posterior oropharyngeal erythema.   Cardiovascular:      Rate and Rhythm: Normal rate and regular rhythm.      Heart sounds: No murmur heard.  Pulmonary:      Effort: Pulmonary effort is normal.      Breath sounds: Normal breath sounds.   Abdominal:      General: Abdomen is flat. There is no distension.   Neurological:      Mental Status: He is alert.         Diagnostic Test Results:  none     ASSESSMENT/PLAN:         (J40) Bronchitis  (primary encounter diagnosis)  Comment:   Plan: amoxicillin (AMOXIL) 875 MG tablet        Discussed starting omeprazole.  Would recommend starting it if the amoxicillin does not work follow-up if neither 1 works.    (J02.9) Sore throat  Comment:   Plan:   The longitudinal plan of care for the diagnosis(es)/condition(s) as documented were addressed during this visit. Due to the added complexity in care, I will continue to support Roddy in the subsequent management and with ongoing continuity of care.      Vincent Welch MD  [facility]

## 2025-07-28 NOTE — NURSING NOTE
Patient here for sore throat and cough for the past 3 weeks. He started taking claritin D daily. Medication Reconciliation: complete.    Mariela Nieto LPN  7/28/2025 3:20 PM

## 2025-08-26 ENCOUNTER — RESULTS FOLLOW-UP (OUTPATIENT)
Dept: FAMILY MEDICINE | Facility: OTHER | Age: 45
End: 2025-08-26

## 2025-08-26 ENCOUNTER — OFFICE VISIT (OUTPATIENT)
Dept: FAMILY MEDICINE | Facility: OTHER | Age: 45
End: 2025-08-26
Attending: FAMILY MEDICINE
Payer: COMMERCIAL

## 2025-08-26 VITALS
TEMPERATURE: 97.3 F | WEIGHT: 196.6 LBS | RESPIRATION RATE: 20 BRPM | OXYGEN SATURATION: 97 % | SYSTOLIC BLOOD PRESSURE: 124 MMHG | BODY MASS INDEX: 26.06 KG/M2 | HEART RATE: 80 BPM | HEIGHT: 73 IN | DIASTOLIC BLOOD PRESSURE: 84 MMHG

## 2025-08-26 DIAGNOSIS — F17.290 CIGAR SMOKER: ICD-10-CM

## 2025-08-26 DIAGNOSIS — Z00.00 ROUTINE GENERAL MEDICAL EXAMINATION AT A HEALTH CARE FACILITY: Primary | ICD-10-CM

## 2025-08-26 PROBLEM — L73.9 FOLLICULITIS: Status: RESOLVED | Noted: 2019-10-24 | Resolved: 2025-08-26

## 2025-08-26 PROBLEM — Z11.3 SCREEN FOR STD (SEXUALLY TRANSMITTED DISEASE): Status: RESOLVED | Noted: 2019-10-24 | Resolved: 2025-08-26

## 2025-08-26 PROBLEM — F41.9 ANXIETY: Status: RESOLVED | Noted: 2019-10-24 | Resolved: 2025-08-26

## 2025-08-26 LAB
ANION GAP SERPL CALCULATED.3IONS-SCNC: 8 MMOL/L (ref 7–15)
BUN SERPL-MCNC: 17.3 MG/DL (ref 6–20)
CALCIUM SERPL-MCNC: 9.2 MG/DL (ref 8.8–10.4)
CHLORIDE SERPL-SCNC: 102 MMOL/L (ref 98–107)
CREAT SERPL-MCNC: 0.96 MG/DL (ref 0.67–1.17)
EGFRCR SERPLBLD CKD-EPI 2021: >90 ML/MIN/1.73M2
GLUCOSE SERPL-MCNC: 94 MG/DL (ref 70–99)
HCO3 SERPL-SCNC: 27 MMOL/L (ref 22–29)
POTASSIUM SERPL-SCNC: 4.3 MMOL/L (ref 3.4–5.3)
SODIUM SERPL-SCNC: 137 MMOL/L (ref 135–145)

## 2025-08-26 PROCEDURE — 80048 BASIC METABOLIC PNL TOTAL CA: CPT | Mod: ZL | Performed by: FAMILY MEDICINE

## 2025-08-26 PROCEDURE — 36415 COLL VENOUS BLD VENIPUNCTURE: CPT | Mod: ZL | Performed by: FAMILY MEDICINE

## 2025-08-26 SDOH — HEALTH STABILITY: PHYSICAL HEALTH: ON AVERAGE, HOW MANY MINUTES DO YOU ENGAGE IN EXERCISE AT THIS LEVEL?: 40 MIN

## 2025-08-26 SDOH — HEALTH STABILITY: PHYSICAL HEALTH: ON AVERAGE, HOW MANY DAYS PER WEEK DO YOU ENGAGE IN MODERATE TO STRENUOUS EXERCISE (LIKE A BRISK WALK)?: 5 DAYS

## 2025-08-26 ASSESSMENT — PAIN SCALES - GENERAL: PAINLEVEL_OUTOF10: NO PAIN (0)
